# Patient Record
Sex: FEMALE | Race: WHITE | NOT HISPANIC OR LATINO | URBAN - METROPOLITAN AREA
[De-identification: names, ages, dates, MRNs, and addresses within clinical notes are randomized per-mention and may not be internally consistent; named-entity substitution may affect disease eponyms.]

---

## 2017-10-11 ENCOUNTER — EMERGENCY (EMERGENCY)
Facility: HOSPITAL | Age: 32
LOS: 1 days | Discharge: HOME | End: 2017-10-11
Attending: EMERGENCY MEDICINE | Admitting: EMERGENCY MEDICINE
Payer: MEDICAID

## 2017-10-11 VITALS
RESPIRATION RATE: 18 BRPM | TEMPERATURE: 98 F | HEART RATE: 64 BPM | OXYGEN SATURATION: 100 % | SYSTOLIC BLOOD PRESSURE: 125 MMHG | DIASTOLIC BLOOD PRESSURE: 68 MMHG

## 2017-10-11 DIAGNOSIS — T45.0X5A ADVERSE EFFECT OF ANTIALLERGIC AND ANTIEMETIC DRUGS, INITIAL ENCOUNTER: ICD-10-CM
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
## 2017-10-11 DIAGNOSIS — Z88.8 ALLERGY STATUS TO OTHER DRUGS, MEDICAMENTS AND BIOLOGICAL SUBSTANCES: ICD-10-CM

## 2017-10-11 DIAGNOSIS — S80.811A ABRASION, RIGHT LOWER LEG, INITIAL ENCOUNTER: ICD-10-CM

## 2017-10-11 DIAGNOSIS — M25.512 PAIN IN LEFT SHOULDER: ICD-10-CM

## 2017-10-11 DIAGNOSIS — S20.412A ABRASION OF LEFT BACK WALL OF THORAX, INITIAL ENCOUNTER: ICD-10-CM
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
## 2017-10-11 DIAGNOSIS — M25.511 PAIN IN RIGHT SHOULDER: ICD-10-CM

## 2017-10-11 DIAGNOSIS — Z88.0 ALLERGY STATUS TO PENICILLIN: ICD-10-CM

## 2017-10-11 DIAGNOSIS — S80.812A ABRASION, LEFT LOWER LEG, INITIAL ENCOUNTER: ICD-10-CM

## 2017-10-11 LAB
ALBUMIN SERPL ELPH-MCNC: 4.2 G/DL — SIGNIFICANT CHANGE UP (ref 3.4–5)
ALP SERPL-CCNC: 143 U/L — HIGH (ref 40–120)
ALT FLD-CCNC: 45 U/L — HIGH (ref 12–42)
ANION GAP SERPL CALC-SCNC: 11 MMOL/L — SIGNIFICANT CHANGE UP (ref 9–16)
AST SERPL-CCNC: 45 U/L — HIGH (ref 15–37)
BILIRUB SERPL-MCNC: 0.5 MG/DL — SIGNIFICANT CHANGE UP (ref 0.2–1.2)
BUN SERPL-MCNC: 15 MG/DL — SIGNIFICANT CHANGE UP (ref 7–23)
CALCIUM SERPL-MCNC: 9 MG/DL — SIGNIFICANT CHANGE UP (ref 8.5–10.5)
CHLORIDE SERPL-SCNC: 104 MMOL/L — SIGNIFICANT CHANGE UP (ref 96–108)
CO2 SERPL-SCNC: 24 MMOL/L — SIGNIFICANT CHANGE UP (ref 22–31)
CREAT SERPL-MCNC: 0.77 MG/DL — SIGNIFICANT CHANGE UP (ref 0.5–1.3)
ETHANOL SERPL-MCNC: < 3 MG/DL — SIGNIFICANT CHANGE UP
GLUCOSE SERPL-MCNC: 99 MG/DL — SIGNIFICANT CHANGE UP (ref 70–99)
HCT VFR BLD CALC: 41.6 % — SIGNIFICANT CHANGE UP (ref 34.5–45)
HGB BLD-MCNC: 14.2 G/DL — SIGNIFICANT CHANGE UP (ref 11.5–15.5)
LIDOCAIN IGE QN: 105 U/L — SIGNIFICANT CHANGE UP (ref 73–393)
MCHC RBC-ENTMCNC: 30 PG — SIGNIFICANT CHANGE UP (ref 27–34)
MCHC RBC-ENTMCNC: 34.1 G/DL — SIGNIFICANT CHANGE UP (ref 32–36)
MCV RBC AUTO: 87.9 FL — SIGNIFICANT CHANGE UP (ref 80–100)
PLATELET # BLD AUTO: 229 K/UL — SIGNIFICANT CHANGE UP (ref 150–400)
POTASSIUM SERPL-MCNC: 3.8 MMOL/L — SIGNIFICANT CHANGE UP (ref 3.5–5.3)
POTASSIUM SERPL-SCNC: 3.8 MMOL/L — SIGNIFICANT CHANGE UP (ref 3.5–5.3)
PROT SERPL-MCNC: 7.6 G/DL — SIGNIFICANT CHANGE UP (ref 6.4–8.2)
RBC # BLD: 4.73 M/UL — SIGNIFICANT CHANGE UP (ref 3.8–5.2)
RBC # FLD: 14.3 % — SIGNIFICANT CHANGE UP (ref 10.3–16.9)
SODIUM SERPL-SCNC: 139 MMOL/L — SIGNIFICANT CHANGE UP (ref 132–145)
WBC # BLD: 6.2 K/UL — SIGNIFICANT CHANGE UP (ref 3.8–10.5)
WBC # FLD AUTO: 6.2 K/UL — SIGNIFICANT CHANGE UP (ref 3.8–10.5)

## 2017-10-11 PROCEDURE — 71010: CPT | Mod: 26

## 2017-10-11 PROCEDURE — 70450 CT HEAD/BRAIN W/O DYE: CPT | Mod: 26

## 2017-10-11 PROCEDURE — 99220: CPT

## 2017-10-11 RX ORDER — ONDANSETRON 8 MG/1
4 TABLET, FILM COATED ORAL ONCE
Qty: 0 | Refills: 0 | Status: COMPLETED | OUTPATIENT
Start: 2017-10-11 | End: 2017-10-11

## 2017-10-11 RX ORDER — SODIUM CHLORIDE 9 MG/ML
1000 INJECTION INTRAMUSCULAR; INTRAVENOUS; SUBCUTANEOUS ONCE
Qty: 0 | Refills: 0 | Status: COMPLETED | OUTPATIENT
Start: 2017-10-11 | End: 2017-10-11

## 2017-10-11 RX ORDER — FAMOTIDINE 10 MG/ML
20 INJECTION INTRAVENOUS ONCE
Qty: 0 | Refills: 0 | Status: COMPLETED | OUTPATIENT
Start: 2017-10-11 | End: 2017-10-11

## 2017-10-11 RX ORDER — EPINEPHRINE 0.3 MG/.3ML
0.3 INJECTION INTRAMUSCULAR; SUBCUTANEOUS
Qty: 1 | Refills: 0 | OUTPATIENT
Start: 2017-10-11 | End: 2017-10-12

## 2017-10-11 RX ORDER — DIPHENHYDRAMINE HCL 50 MG
25 CAPSULE ORAL ONCE
Qty: 0 | Refills: 0 | Status: COMPLETED | OUTPATIENT
Start: 2017-10-11 | End: 2017-10-11

## 2017-10-11 RX ORDER — KETOROLAC TROMETHAMINE 30 MG/ML
30 SYRINGE (ML) INJECTION ONCE
Qty: 0 | Refills: 0 | Status: DISCONTINUED | OUTPATIENT
Start: 2017-10-11 | End: 2017-10-11

## 2017-10-11 RX ORDER — DIPHENHYDRAMINE HCL 50 MG
50 CAPSULE ORAL ONCE
Qty: 0 | Refills: 0 | Status: COMPLETED | OUTPATIENT
Start: 2017-10-11 | End: 2017-10-11

## 2017-10-11 RX ORDER — FAMOTIDINE 10 MG/ML
1 INJECTION INTRAVENOUS
Qty: 10 | Refills: 0 | OUTPATIENT
Start: 2017-10-11 | End: 2017-10-16

## 2017-10-11 RX ORDER — CETIRIZINE HYDROCHLORIDE 10 MG/1
1 TABLET ORAL
Qty: 5 | Refills: 0 | OUTPATIENT
Start: 2017-10-11 | End: 2017-10-16

## 2017-10-11 RX ADMIN — ONDANSETRON 4 MILLIGRAM(S): 8 TABLET, FILM COATED ORAL at 19:45

## 2017-10-11 RX ADMIN — Medication 50 MILLIGRAM(S): at 20:50

## 2017-10-11 RX ADMIN — SODIUM CHLORIDE 1000 MILLILITER(S): 9 INJECTION INTRAMUSCULAR; INTRAVENOUS; SUBCUTANEOUS at 19:45

## 2017-10-11 RX ADMIN — Medication 25 MILLIGRAM(S): at 20:50

## 2017-10-11 RX ADMIN — FAMOTIDINE 20 MILLIGRAM(S): 10 INJECTION INTRAVENOUS at 20:50

## 2017-10-11 RX ADMIN — Medication 125 MILLIGRAM(S): at 20:50
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
## 2017-10-11 NOTE — ED PROVIDER NOTE - MUSCULOSKELETAL, MLM
Spine appears normal, range of motion is not limited, b/l clavicle ttp, no deformity, no other pauline ttp.

## 2017-10-11 NOTE — ED ADULT NURSE REASSESSMENT NOTE - NS ED NURSE REASSESS COMMENT FT1
Pt had hives after getting benadryl. Medications were given as ordered. Pt had hives after getting benadryl. Medications were given as ordered. MD chavis.  Benadryl 50mg IV  solumedrol 125mg IV  pepcid 20mg IV    Pt states she feels better.
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
## 2017-10-11 NOTE — ED ADULT NURSE NOTE - CAS TRG GENERAL NORM CIRC DET
Strong peripheral pulses Shai Davis MD Well appearing. No distress. Happy and playful. Clear conj, PEERL, EOMI, davida-pharynx benign, supple neck, FROM, chest clear, RRR, Benign abd, Nonfocal neuro, diffuse fine papular rash concentrated on trunk but involving face and extremities.  Not palms or soles.

## 2017-10-11 NOTE — ED CDU PROVIDER NOTE - DETAILS
patient placed on obs of somnolence after fall, poss drug ingestion, and allergic reaction in ED to Zpfran that was Tx'd Benadryl 75,g, Pepcid and Solumedrol.
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
## 2017-10-11 NOTE — ED PROVIDER NOTE - PROGRESS NOTE DETAILS
CT head and CXR clar, labs wnl (mild elevation in LFTs). had allergic reaction to Zofran- dv'tr hives on arm and throat tightness. OP clear, lungs clear. Got Benadryl, Pepcid and Solumedrol. Declined Epi and was given second dose of Benadryl 255mg- appears that she takes up to 100mg at night usually. Will place on obs.

## 2017-10-12 VITALS
RESPIRATION RATE: 18 BRPM | OXYGEN SATURATION: 100 % | DIASTOLIC BLOOD PRESSURE: 89 MMHG | SYSTOLIC BLOOD PRESSURE: 128 MMHG | HEART RATE: 50 BPM | TEMPERATURE: 98 F

## 2017-10-12 PROCEDURE — 99217: CPT

## 2017-10-12 RX ORDER — DIPHENHYDRAMINE HCL 50 MG
25 CAPSULE ORAL ONCE
Qty: 0 | Refills: 0 | Status: COMPLETED | OUTPATIENT
Start: 2017-10-12 | End: 2017-10-12

## 2017-10-12 RX ORDER — KETOROLAC TROMETHAMINE 30 MG/ML
15 SYRINGE (ML) INJECTION ONCE
Qty: 0 | Refills: 0 | Status: DISCONTINUED | OUTPATIENT
Start: 2017-10-12 | End: 2017-10-12

## 2017-10-12 RX ORDER — METOCLOPRAMIDE HCL 10 MG
10 TABLET ORAL ONCE
Qty: 0 | Refills: 0 | Status: COMPLETED | OUTPATIENT
Start: 2017-10-12 | End: 2017-10-12

## 2017-10-12 RX ADMIN — Medication 10 MILLIGRAM(S): at 02:36

## 2017-10-12 RX ADMIN — Medication 25 MILLIGRAM(S): at 02:49

## 2017-10-12 RX ADMIN — Medication 15 MILLIGRAM(S): at 02:46

## 2017-10-12 NOTE — ED ADULT NURSE REASSESSMENT NOTE - NS ED NURSE REASSESS COMMENT FT1
given reglan as per md order for nausea; around 0240 patient complained of hives to right arm durign administration; iv medication stopped; MD Raymundo made aware and verbal order given to give benadryl 25mg iv; gave patient 25mg iv benadryl as per MD Raymundo order ; hives and redness receding around [id number]; patient stated less itching at this time

## 2018-03-26 ENCOUNTER — EMERGENCY (EMERGENCY)
Facility: HOSPITAL | Age: 33
LOS: 1 days | Discharge: HOME | End: 2018-03-26
Attending: EMERGENCY MEDICINE | Admitting: EMERGENCY MEDICINE
Payer: MEDICAID

## 2018-03-26 VITALS
TEMPERATURE: 98 F | RESPIRATION RATE: 16 BRPM | DIASTOLIC BLOOD PRESSURE: 69 MMHG | OXYGEN SATURATION: 99 % | HEART RATE: 85 BPM | SYSTOLIC BLOOD PRESSURE: 110 MMHG

## 2018-03-26 VITALS
HEART RATE: 51 BPM | TEMPERATURE: 98 F | DIASTOLIC BLOOD PRESSURE: 80 MMHG | OXYGEN SATURATION: 96 % | SYSTOLIC BLOOD PRESSURE: 115 MMHG | RESPIRATION RATE: 18 BRPM

## 2018-03-26 DIAGNOSIS — Z88.0 ALLERGY STATUS TO PENICILLIN: ICD-10-CM

## 2018-03-26 DIAGNOSIS — R21 RASH AND OTHER NONSPECIFIC SKIN ERUPTION: ICD-10-CM

## 2018-03-26 DIAGNOSIS — Z88.8 ALLERGY STATUS TO OTHER DRUGS, MEDICAMENTS AND BIOLOGICAL SUBSTANCES: ICD-10-CM

## 2018-03-26 DIAGNOSIS — K92.0 HEMATEMESIS: ICD-10-CM
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
## 2018-03-26 DIAGNOSIS — L73.9 FOLLICULAR DISORDER, UNSPECIFIED: ICD-10-CM

## 2018-03-26 LAB
AMPHET UR-MCNC: NEGATIVE — SIGNIFICANT CHANGE UP
APPEARANCE UR: CLEAR — SIGNIFICANT CHANGE UP
BACTERIA # UR AUTO: PRESENT /HPF
BARBITURATES UR SCN-MCNC: NEGATIVE — SIGNIFICANT CHANGE UP
BASOPHILS NFR BLD AUTO: 0.5 % — SIGNIFICANT CHANGE UP (ref 0–2)
BENZODIAZ UR-MCNC: POSITIVE
BILIRUB UR-MCNC: NEGATIVE — SIGNIFICANT CHANGE UP
COCAINE METAB.OTHER UR-MCNC: NEGATIVE — SIGNIFICANT CHANGE UP
COLOR SPEC: YELLOW — SIGNIFICANT CHANGE UP
DIFF PNL FLD: (no result)
EOSINOPHIL NFR BLD AUTO: 2.6 % — SIGNIFICANT CHANGE UP (ref 0–6)
EPI CELLS # UR: SIGNIFICANT CHANGE UP /HPF
ETHANOL SERPL-MCNC: <3 MG/DL — SIGNIFICANT CHANGE UP
GLUCOSE UR QL: NEGATIVE — SIGNIFICANT CHANGE UP
HCG UR QL: NEGATIVE — SIGNIFICANT CHANGE UP
HCT VFR BLD CALC: 38.9 % — SIGNIFICANT CHANGE UP (ref 34.5–45)
HGB BLD-MCNC: 13.6 G/DL — SIGNIFICANT CHANGE UP (ref 11.5–15.5)
IMM GRANULOCYTES NFR BLD AUTO: 0.3 % — SIGNIFICANT CHANGE UP (ref 0–1.5)
KETONES UR-MCNC: (no result) MG/DL
LEUKOCYTE ESTERASE UR-ACNC: (no result)
LYMPHOCYTES # BLD AUTO: 34.7 % — SIGNIFICANT CHANGE UP (ref 13–44)
MCHC RBC-ENTMCNC: 30.7 PG — SIGNIFICANT CHANGE UP (ref 27–34)
MCHC RBC-ENTMCNC: 35 G/DL — SIGNIFICANT CHANGE UP (ref 32–36)
MCV RBC AUTO: 87.8 FL — SIGNIFICANT CHANGE UP (ref 80–100)
METHADONE UR-MCNC: POSITIVE
MONOCYTES NFR BLD AUTO: 4.3 % — SIGNIFICANT CHANGE UP (ref 2–14)
NEUTROPHILS NFR BLD AUTO: 57.6 % — SIGNIFICANT CHANGE UP (ref 43–77)
NITRITE UR-MCNC: NEGATIVE — SIGNIFICANT CHANGE UP
OPIATES UR-MCNC: NEGATIVE — SIGNIFICANT CHANGE UP
PCP SPEC-MCNC: SIGNIFICANT CHANGE UP
PCP UR-MCNC: NEGATIVE — SIGNIFICANT CHANGE UP
PH UR: 6 — SIGNIFICANT CHANGE UP (ref 5–8)
PLATELET # BLD AUTO: 278 K/UL — SIGNIFICANT CHANGE UP (ref 150–400)
PROT UR-MCNC: (no result) MG/DL
RBC # BLD: 4.43 M/UL — SIGNIFICANT CHANGE UP (ref 3.8–5.2)
RBC # FLD: 13.2 % — SIGNIFICANT CHANGE UP (ref 10.3–16.9)
RBC CASTS # UR COMP ASSIST: (no result) /HPF
SP GR SPEC: 1.02 — SIGNIFICANT CHANGE UP (ref 1–1.03)
THC UR QL: POSITIVE
UROBILINOGEN FLD QL: 1 E.U./DL — SIGNIFICANT CHANGE UP
WBC # BLD: 10.1 K/UL — SIGNIFICANT CHANGE UP (ref 3.8–10.5)
WBC # FLD AUTO: 10.1 K/UL — SIGNIFICANT CHANGE UP (ref 3.8–10.5)
WBC UR QL: (no result) /HPF

## 2018-03-26 PROCEDURE — 99284 EMERGENCY DEPT VISIT MOD MDM: CPT

## 2018-03-26 RX ORDER — FAMOTIDINE 10 MG/ML
20 INJECTION INTRAVENOUS ONCE
Qty: 0 | Refills: 0 | Status: COMPLETED | OUTPATIENT
Start: 2018-03-26 | End: 2018-03-26

## 2018-03-26 RX ORDER — DIPHENHYDRAMINE HCL 50 MG
50 CAPSULE ORAL ONCE
Qty: 0 | Refills: 0 | Status: COMPLETED | OUTPATIENT
Start: 2018-03-26 | End: 2018-03-26

## 2018-03-26 RX ORDER — AZTREONAM 2 G
2 VIAL (EA) INJECTION
Qty: 40 | Refills: 0 | OUTPATIENT
Start: 2018-03-26 | End: 2018-04-04

## 2018-03-26 RX ORDER — METOCLOPRAMIDE HCL 10 MG
10 TABLET ORAL ONCE
Qty: 0 | Refills: 0 | Status: COMPLETED | OUTPATIENT
Start: 2018-03-26 | End: 2018-03-26

## 2018-03-26 RX ORDER — SODIUM CHLORIDE 9 MG/ML
1000 INJECTION INTRAMUSCULAR; INTRAVENOUS; SUBCUTANEOUS ONCE
Qty: 0 | Refills: 0 | Status: COMPLETED | OUTPATIENT
Start: 2018-03-26 | End: 2018-03-26

## 2018-03-26 RX ORDER — OMEPRAZOLE 10 MG/1
1 CAPSULE, DELAYED RELEASE ORAL
Qty: 14 | Refills: 0 | OUTPATIENT
Start: 2018-03-26 | End: 2018-04-08

## 2018-03-26 RX ORDER — ACETAMINOPHEN 500 MG
2 TABLET ORAL
Qty: 60 | Refills: 0 | OUTPATIENT
Start: 2018-03-26 | End: 2018-03-30

## 2018-03-26 RX ADMIN — Medication 100 MILLIGRAM(S): at 19:24

## 2018-03-26 RX ADMIN — SODIUM CHLORIDE 2000 MILLILITER(S): 9 INJECTION INTRAMUSCULAR; INTRAVENOUS; SUBCUTANEOUS at 19:24

## 2018-03-26 RX ADMIN — FAMOTIDINE 20 MILLIGRAM(S): 10 INJECTION INTRAVENOUS at 19:24

## 2018-03-26 RX ADMIN — Medication 10 MILLIGRAM(S): at 19:24

## 2018-03-26 RX ADMIN — Medication 50 MILLIGRAM(S): at 20:06

## 2018-03-26 NOTE — ED PROVIDER NOTE - CONSTITUTIONAL, MLM
normal... Well appearing, well nourished, awake, alert, oriented to person, place, time/situation and in no apparent distress. In distress due to pain. awake, alert, oriented to person, place, time/situation.

## 2018-03-26 NOTE — ED PROVIDER NOTE - OBJECTIVE STATEMENT
32y F with PMHx herniated disc, on methadone 130mg (3 years), has IUD allergy to penicillin, presents to ED for rash x 1.5 week. Pt went to PMD after she began experiencing painful "bumps" on her left axilla. She was prescribed Valtrex and bactrim, with no improvement of symptoms. Pt also states she has been vomiting with blood in emesis. Pt has had abscess in the past (left foot, s/p I&D). Lives in [place].

## 2018-03-26 NOTE — ED PROVIDER NOTE - MEDICAL DECISION MAKING DETAILS
Blood work, IV fluids, antibiotics, anti-emetics, and re-assess. Labs, fluids, IV dose clindamycin, and re-assess

## 2018-03-26 NOTE — ED ADULT TRIAGE NOTE - CHIEF COMPLAINT QUOTE
pt presents with rash to the left side of the body. states that she believes it is an infection or shingles. has been treated for both previously.

## 2018-03-26 NOTE — ED PROVIDER NOTE - SKIN, MLM
Left axilla: Left axilla: rash that is vesicular on an erythematous base with some areas of induration and tenderness, but no fluctuance noted on exam. Left axilla: rash has several areas of swollen follicles on erythematous base with some areas of induration and tenderness, but no fluctuance noted on exam.

## 2018-03-26 NOTE — ED PROVIDER NOTE - PROGRESS NOTE DETAILS
part of labs hemolyzed but pt refused redraw, US bedside shows no abscess collection. Will change abx to bactrim since there is skin infection (folliculitis) and UA looks infected as well. Instructed pt that if there is worsening symptoms to [facility] for re-evaluation. States she feels her PUD is exacerbated due to vomiting, so started on PPI and recommend GI F/U

## 2018-03-26 NOTE — ED ADULT NURSE REASSESSMENT NOTE - NS ED NURSE REASSESS COMMENT FT1
Patient's blood specimen hemolyzed - she is refusing blood work unless she receives something for pain/relaxation. Of note, patient received IV benadryl and her urine tox is positive for benzodiazepines, THC and methadone. Patient also holding onto a bag of her vomit, which she states has blood in it. Patient's emesis WNL & her H & H is stable. She reports nausea, but she was been visualized drinking from her [place] [employer]ced Tea. Pt moved to room for ultrasound of her right axilla. Safety and comfort maintained, will continue to monitor.

## 2018-08-29 ENCOUNTER — INPATIENT (INPATIENT)
Facility: HOSPITAL | Age: 33
LOS: 0 days | Discharge: HOME | End: 2018-08-30
Attending: INTERNAL MEDICINE | Admitting: INTERNAL MEDICINE
Payer: MEDICAID

## 2018-08-29 VITALS
DIASTOLIC BLOOD PRESSURE: 83 MMHG | HEIGHT: 67 IN | HEART RATE: 80 BPM | TEMPERATURE: 98 F | RESPIRATION RATE: 20 BRPM | SYSTOLIC BLOOD PRESSURE: 118 MMHG | WEIGHT: 139.99 LBS

## 2018-08-29 LAB
ALBUMIN SERPL ELPH-MCNC: 4.4 G/DL — SIGNIFICANT CHANGE UP (ref 3.3–5)
ALP SERPL-CCNC: 115 U/L — SIGNIFICANT CHANGE UP (ref 40–120)
ALT FLD-CCNC: 37 U/L — SIGNIFICANT CHANGE UP (ref 12–78)
ANION GAP SERPL CALC-SCNC: 6 MMOL/L — SIGNIFICANT CHANGE UP (ref 5–17)
APPEARANCE UR: CLEAR — SIGNIFICANT CHANGE UP
APTT BLD: 38.5 SEC — HIGH (ref 27.5–37.4)
AST SERPL-CCNC: 30 U/L — SIGNIFICANT CHANGE UP (ref 15–37)
BASOPHILS # BLD AUTO: 0.07 K/UL — SIGNIFICANT CHANGE UP (ref 0–0.2)
BASOPHILS NFR BLD AUTO: 0.6 % — SIGNIFICANT CHANGE UP (ref 0–2)
BILIRUB SERPL-MCNC: 0.3 MG/DL — SIGNIFICANT CHANGE UP (ref 0.2–1.2)
BILIRUB UR-MCNC: NEGATIVE — SIGNIFICANT CHANGE UP
BUN SERPL-MCNC: 13 MG/DL — SIGNIFICANT CHANGE UP (ref 7–23)
CALCIUM SERPL-MCNC: 9.7 MG/DL — SIGNIFICANT CHANGE UP (ref 8.5–10.1)
CHLORIDE SERPL-SCNC: 103 MMOL/L — SIGNIFICANT CHANGE UP (ref 96–108)
CO2 SERPL-SCNC: 29 MMOL/L — SIGNIFICANT CHANGE UP (ref 22–31)
COLOR SPEC: YELLOW — SIGNIFICANT CHANGE UP
CREAT SERPL-MCNC: 0.92 MG/DL — SIGNIFICANT CHANGE UP (ref 0.5–1.3)
DIFF PNL FLD: NEGATIVE — SIGNIFICANT CHANGE UP
EOSINOPHIL # BLD AUTO: 0.07 K/UL — SIGNIFICANT CHANGE UP (ref 0–0.5)
EOSINOPHIL NFR BLD AUTO: 0.6 % — SIGNIFICANT CHANGE UP (ref 0–6)
GLUCOSE SERPL-MCNC: 93 MG/DL — SIGNIFICANT CHANGE UP (ref 70–99)
GLUCOSE UR QL: NEGATIVE MG/DL — SIGNIFICANT CHANGE UP
HCG SERPL-ACNC: <1 MIU/ML — SIGNIFICANT CHANGE UP
HCT VFR BLD CALC: 39.3 % — SIGNIFICANT CHANGE UP (ref 34.5–45)
HGB BLD-MCNC: 13.7 G/DL — SIGNIFICANT CHANGE UP (ref 11.5–15.5)
IMM GRANULOCYTES NFR BLD AUTO: 0.3 % — SIGNIFICANT CHANGE UP (ref 0–1.5)
INR BLD: 1.04 RATIO — SIGNIFICANT CHANGE UP (ref 0.88–1.16)
KETONES UR-MCNC: NEGATIVE — SIGNIFICANT CHANGE UP
LEUKOCYTE ESTERASE UR-ACNC: NEGATIVE — SIGNIFICANT CHANGE UP
LIDOCAIN IGE QN: 85 U/L — SIGNIFICANT CHANGE UP (ref 73–393)
LYMPHOCYTES # BLD AUTO: 35.2 % — SIGNIFICANT CHANGE UP (ref 13–44)
LYMPHOCYTES # BLD AUTO: 4.07 K/UL — HIGH (ref 1–3.3)
MCHC RBC-ENTMCNC: 31.1 PG — SIGNIFICANT CHANGE UP (ref 27–34)
MCHC RBC-ENTMCNC: 34.9 GM/DL — SIGNIFICANT CHANGE UP (ref 32–36)
MCV RBC AUTO: 89.1 FL — SIGNIFICANT CHANGE UP (ref 80–100)
MONOCYTES # BLD AUTO: 0.52 K/UL — SIGNIFICANT CHANGE UP (ref 0–0.9)
MONOCYTES NFR BLD AUTO: 4.5 % — SIGNIFICANT CHANGE UP (ref 2–14)
NEUTROPHILS # BLD AUTO: 6.79 K/UL — SIGNIFICANT CHANGE UP (ref 1.8–7.4)
NEUTROPHILS NFR BLD AUTO: 58.8 % — SIGNIFICANT CHANGE UP (ref 43–77)
NITRITE UR-MCNC: NEGATIVE — SIGNIFICANT CHANGE UP
PCP SPEC-MCNC: SIGNIFICANT CHANGE UP
PH UR: 8 — SIGNIFICANT CHANGE UP (ref 5–8)
PLATELET # BLD AUTO: 285 K/UL — SIGNIFICANT CHANGE UP (ref 150–400)
POTASSIUM SERPL-MCNC: 4 MMOL/L — SIGNIFICANT CHANGE UP (ref 3.5–5.3)
POTASSIUM SERPL-SCNC: 4 MMOL/L — SIGNIFICANT CHANGE UP (ref 3.5–5.3)
PROT SERPL-MCNC: 8.1 GM/DL — SIGNIFICANT CHANGE UP (ref 6–8.3)
PROT UR-MCNC: NEGATIVE MG/DL — SIGNIFICANT CHANGE UP
PROTHROM AB SERPL-ACNC: 11.2 SEC — SIGNIFICANT CHANGE UP (ref 9.8–12.7)
RBC # BLD: 4.41 M/UL — SIGNIFICANT CHANGE UP (ref 3.8–5.2)
RBC # FLD: 13.2 % — SIGNIFICANT CHANGE UP (ref 10.3–14.5)
SODIUM SERPL-SCNC: 138 MMOL/L — SIGNIFICANT CHANGE UP (ref 135–145)
SP GR SPEC: 1.01 — SIGNIFICANT CHANGE UP (ref 1.01–1.02)
TYPE + AB SCN PNL BLD: SIGNIFICANT CHANGE UP
UROBILINOGEN FLD QL: 1 MG/DL
WBC # BLD: 11.56 K/UL — HIGH (ref 3.8–10.5)
WBC # FLD AUTO: 11.56 K/UL — HIGH (ref 3.8–10.5)

## 2018-08-29 PROCEDURE — 99285 EMERGENCY DEPT VISIT HI MDM: CPT

## 2018-08-29 PROCEDURE — 71045 X-RAY EXAM CHEST 1 VIEW: CPT | Mod: 26

## 2018-08-29 PROCEDURE — 93010 ELECTROCARDIOGRAM REPORT: CPT

## 2018-08-29 RX ORDER — DIPHENHYDRAMINE HCL 50 MG
25 CAPSULE ORAL EVERY 6 HOURS
Qty: 0 | Refills: 0 | Status: DISCONTINUED | OUTPATIENT
Start: 2018-08-29 | End: 2018-08-30

## 2018-08-29 RX ORDER — PANTOPRAZOLE SODIUM 20 MG/1
8 TABLET, DELAYED RELEASE ORAL
Qty: 80 | Refills: 0 | Status: DISCONTINUED | OUTPATIENT
Start: 2018-08-29 | End: 2018-08-30

## 2018-08-29 RX ORDER — ONDANSETRON 8 MG/1
4 TABLET, FILM COATED ORAL ONCE
Qty: 0 | Refills: 0 | Status: COMPLETED | OUTPATIENT
Start: 2018-08-29 | End: 2018-08-29

## 2018-08-29 RX ORDER — PANTOPRAZOLE SODIUM 20 MG/1
40 TABLET, DELAYED RELEASE ORAL
Qty: 0 | Refills: 0 | Status: DISCONTINUED | OUTPATIENT
Start: 2018-08-29 | End: 2018-08-30

## 2018-08-29 RX ORDER — DIPHENHYDRAMINE HCL 50 MG
50 CAPSULE ORAL ONCE
Qty: 0 | Refills: 0 | Status: COMPLETED | OUTPATIENT
Start: 2018-08-29 | End: 2018-08-29

## 2018-08-29 RX ORDER — DIPHENHYDRAMINE HCL 50 MG
25 CAPSULE ORAL ONCE
Qty: 0 | Refills: 0 | Status: COMPLETED | OUTPATIENT
Start: 2018-08-29 | End: 2018-08-29

## 2018-08-29 RX ORDER — PANTOPRAZOLE SODIUM 20 MG/1
40 TABLET, DELAYED RELEASE ORAL ONCE
Qty: 0 | Refills: 0 | Status: COMPLETED | OUTPATIENT
Start: 2018-08-29 | End: 2018-08-29

## 2018-08-29 RX ORDER — SOD SULF/SODIUM/NAHCO3/KCL/PEG
4000 SOLUTION, RECONSTITUTED, ORAL ORAL ONCE
Qty: 0 | Refills: 0 | Status: COMPLETED | OUTPATIENT
Start: 2018-08-29 | End: 2018-08-29

## 2018-08-29 RX ORDER — SODIUM CHLORIDE 9 MG/ML
1000 INJECTION, SOLUTION INTRAVENOUS
Qty: 0 | Refills: 0 | Status: DISCONTINUED | OUTPATIENT
Start: 2018-08-29 | End: 2018-08-30

## 2018-08-29 RX ORDER — DIPHENHYDRAMINE HCL 50 MG
25 CAPSULE ORAL EVERY 6 HOURS
Qty: 0 | Refills: 0 | Status: DISCONTINUED | OUTPATIENT
Start: 2018-08-29 | End: 2018-08-29

## 2018-08-29 RX ORDER — METHADONE HYDROCHLORIDE 40 MG/1
130 TABLET ORAL DAILY
Qty: 0 | Refills: 0 | Status: DISCONTINUED | OUTPATIENT
Start: 2018-08-29 | End: 2018-08-30

## 2018-08-29 RX ORDER — ONDANSETRON 8 MG/1
4 TABLET, FILM COATED ORAL EVERY 6 HOURS
Qty: 0 | Refills: 0 | Status: DISCONTINUED | OUTPATIENT
Start: 2018-08-29 | End: 2018-08-29

## 2018-08-29 RX ORDER — SODIUM CHLORIDE 9 MG/ML
1000 INJECTION INTRAMUSCULAR; INTRAVENOUS; SUBCUTANEOUS ONCE
Qty: 0 | Refills: 0 | Status: COMPLETED | OUTPATIENT
Start: 2018-08-29 | End: 2018-08-29

## 2018-08-29 RX ORDER — ACETAMINOPHEN 500 MG
650 TABLET ORAL EVERY 6 HOURS
Qty: 0 | Refills: 0 | Status: DISCONTINUED | OUTPATIENT
Start: 2018-08-29 | End: 2018-08-30

## 2018-08-29 RX ORDER — ACETAMINOPHEN 500 MG
1000 TABLET ORAL ONCE
Qty: 0 | Refills: 0 | Status: COMPLETED | OUTPATIENT
Start: 2018-08-29 | End: 2018-08-29

## 2018-08-29 RX ORDER — METOCLOPRAMIDE HCL 10 MG
5 TABLET ORAL EVERY 12 HOURS
Qty: 0 | Refills: 0 | Status: DISCONTINUED | OUTPATIENT
Start: 2018-08-29 | End: 2018-08-30

## 2018-08-29 RX ADMIN — Medication 1000 MILLIGRAM(S): at 19:35

## 2018-08-29 RX ADMIN — Medication 5 MILLIGRAM(S): at 13:55

## 2018-08-29 RX ADMIN — Medication 4000 MILLILITER(S): at 16:19

## 2018-08-29 RX ADMIN — Medication 25 MILLIGRAM(S): at 16:01

## 2018-08-29 RX ADMIN — PANTOPRAZOLE SODIUM 10 MG/HR: 20 TABLET, DELAYED RELEASE ORAL at 04:19

## 2018-08-29 RX ADMIN — METHADONE HYDROCHLORIDE 130 MILLIGRAM(S): 40 TABLET ORAL at 11:21

## 2018-08-29 RX ADMIN — Medication 25 MILLIGRAM(S): at 22:38

## 2018-08-29 RX ADMIN — ONDANSETRON 4 MILLIGRAM(S): 8 TABLET, FILM COATED ORAL at 03:10

## 2018-08-29 RX ADMIN — Medication 400 MILLIGRAM(S): at 19:02

## 2018-08-29 RX ADMIN — PANTOPRAZOLE SODIUM 10 MG/HR: 20 TABLET, DELAYED RELEASE ORAL at 19:33

## 2018-08-29 RX ADMIN — SODIUM CHLORIDE 100 MILLILITER(S): 9 INJECTION, SOLUTION INTRAVENOUS at 13:58

## 2018-08-29 RX ADMIN — Medication 1 MILLIGRAM(S): at 16:19

## 2018-08-29 RX ADMIN — Medication 25 MILLIGRAM(S): at 05:19

## 2018-08-29 RX ADMIN — Medication 50 MILLIGRAM(S): at 08:48

## 2018-08-29 RX ADMIN — Medication 125 MILLIGRAM(S): at 04:05

## 2018-08-29 RX ADMIN — PANTOPRAZOLE SODIUM 40 MILLIGRAM(S): 20 TABLET, DELAYED RELEASE ORAL at 03:10

## 2018-08-29 RX ADMIN — SODIUM CHLORIDE 1000 MILLILITER(S): 9 INJECTION INTRAMUSCULAR; INTRAVENOUS; SUBCUTANEOUS at 03:10

## 2018-08-29 RX ADMIN — Medication 25 MILLIGRAM(S): at 03:24

## 2018-08-29 RX ADMIN — Medication 1 MILLIGRAM(S): at 13:12

## 2018-08-29 NOTE — ED ADULT NURSE NOTE - OBJECTIVE STATEMENT
Patient BIBA complaining of blood in emesis and stool. Patient states she became nauseous and began vomiting earlier this afternoon, 4 episodes of vomiting today. Patient states there has been blood in stool and emesis, denies diarrhea. Patient states she had a chills and fever of 103, took Tylenol at home 2 hours ago, patient currently afebrile. Patient complaining of dizziness, decreased eating and drinking, abdominal pain, bloating, and distention. PMHx of ulcers, HTN. Patient denies SOB, CP. Boyfriend at bedside. VS stable

## 2018-08-29 NOTE — ED PROVIDER NOTE - OBJECTIVE STATEMENT
32 yo f with hx of substance abuse on methadone, xanax, clonidine and PUD presents stating she vomited and had a bloody bowel movement today while watching her daughter.  no precipitating or exacerbating factors.  per patient she does not drink alcohol either, no drug use.  no vomiting prior to vomiting blood, no dysuria or vaginal discharge.  c/o abdominal pain.  does not have a gastroenterologist

## 2018-08-29 NOTE — ED ADULT TRIAGE NOTE - CHIEF COMPLAINT QUOTE
pt BIBEMS c/o abd pain and distention starting earlier today. pt had 3 episodes of BRB in stool and emesis.

## 2018-08-29 NOTE — ED ADULT NURSE REASSESSMENT NOTE - NS ED NURSE REASSESS COMMENT FT1
Benadryl administered for severe itching per patient with reported relief. No signs of rash or erythema present. MD Ram aware. Will continue to monitor.

## 2018-08-29 NOTE — ED PROVIDER NOTE - NS HIV RISK FACTOR YES
[facility] PROGRESS NOTE  Pulmonary, Critical Care, and Sleep Medicine    Name: Cony Rapp MRN: [id number]   : 1949 Hospital: [facility]   Date: 2017  Admission Date: 2017     Chart and notes reviewed. Data reviewed. I review the patient's current medications in the medical record at each encounter.  I have evaluated and examined the patient. .     Overnight events reviewed:  No overnight events  Afebrile  BP stable  99% RA    ROS: Feels much better. Able to talk without coughing as much. Feels her sputum is clearing, not much color to sputum this morning. Denies cp/n/v. Reports significant improvement in breathing after cardioversion when she came in. Vital Signs:  Visit Vitals    /74 (BP 1 Location: Right arm, BP Patient Position: Sitting)    Pulse 98    Temp 97.7 °F (36.5 °C)    Resp 18    Ht 5' 6\" (1.676 m)    Wt 75 kg (165 lb 5.5 oz)    SpO2 99%    Breastfeeding No    BMI 26.69 kg/m2       O2 Device: Room air   O2 Flow Rate (L/min): 1 l/min   Temp (24hrs), Av.9 °F (36.6 °C), Min:97.2 °F (36.2 °C), Max:98.5 °F (36.9 °C)       Intake/Output:   Last shift:         Last 3 shifts:  1901 -  0700  In: 1120 [P.O.:1120]  Out: 2625 [Urine:2625]    Intake/Output Summary (Last 24 hours) at 17 0841  Last data filed at 17 5027   Gross per 24 hour   Intake             1120 ml   Output             2625 ml   Net            -1505 ml       Physical Exam:   General:  Alert, cooperative, no distress, appears stated age. Head:  Normocephalic, without obvious abnormality, atraumatic. Eyes:  Conjunctivae/corneas clear. PERRL, EOMs intact. Nose: Nares normal. Septum midline. Mucosa normal. No drainage or sinus tenderness. Throat: Lips, mucosa, and tongue normal. Teeth and gums normal.   Neck: Supple, symmetrical, trachea midline, no adenopathy, thyroid: no enlargment/tenderness/nodules, no carotid bruit and no JVD. Lungs:   Diminished bilaterally. Chest wall:  No tenderness or deformity. Heart:  Regular rate and rhythm, S1, S2 normal, no murmur, click, rub or gallop. Abdomen:   Soft, non-tender. Bowel sounds normal. No masses,  No organomegaly. Extremities: Extremities normal, atraumatic, no cyanosis or edema. Pulses: 2+ and symmetric all extremities. Skin: Skin color, texture, turgor normal. No rashes or lesions   Lymph nodes: Cervical, supraclavicular, and axillary nodes normal.   Neurologic: Grossly nonfocal     DATA:  MAR reviewed and pertinent medications noted or modified as needed    Labs:  Recent Labs      05/18/17   0443   WBC  6.7   HGB  11.7   HCT  36.2   PLT  275     Recent Labs      05/19/17   0428  05/18/17   0443  05/17/17   0330   NA  134*  139  138   K  4.1  3.9  3.5   CL  99  104  102   CO2  23  25  27   GLU  288*  131*  153*   BUN  15  11  13   CREA  1.08*  0.99  0.89   CA  8.9  8.8  8.6   MG  2.1  2.1  2.2   INR  1.3*  1.1  1.0       Imaging:  I have personally reviewed the patients radiographs and reports. No new imaging this morning. IMPRESSION:   · Hemoptysis has resolved, denies seeing any recently.   · COPD/Emphysema  · Acute systolic Heart Failure  · A Fib  · C Diff   · DM  · Prior tobacco use      PLAN:   · Cont Diuresis  · She is back on Plavix and Coumadin  · Cont Breo and Incruse  · Repeat CT in 8-12 for resolution         Donne Severe, DEEPAK Declined

## 2018-08-29 NOTE — PATIENT PROFILE ADULT. - COMFORT LEVEL, ACCEPTABLE
Nephrology ([facility]) Progress Note      Patient:  Wild Bravo  YOB: 1932  Date of Service: 9/11/2017  MRN: [id number]   Acct: [id number]   Primary Care Physician: Eliel Ames MD  Advance Directive: Full Code  Admit Date: 9/8/2017       Hospital Day: 1  Referring Provider: No ref. provider found      Patient personally seen and examined.  Complete chart including Consults, Notes, Operative Reports, Labs, Cardiology, and Radiology studies reviewed as able.        Subjective:  Wild Bravo is a 85 y.o. male  whom we were consulted for chronic kidney disease management.  Follows with Dr Crawford; creatinine had recently been 2.6 mg; improved to 1.8 mg after decreasing diuretic dose.  Admitted after a fall at home.  Only complaint has been of mild dyspnea.  Family has been very concerned about pt's inability to care for self at home.  No complaint today, anxious to go home soon.  No new overnight issues.    Allergies:  Keflex [cephalexin]    Home Meds:  Prescriptions Prior to Admission   Medication Sig Dispense Refill Last Dose   • amiodarone (PACERONE) 200 MG tablet Take 1 tablet by mouth Daily. 30 tablet 1 Past Week at Unknown time   • carvedilol (COREG) 3.125 MG tablet Take 3.125 mg by mouth 2 (Two) Times a Day With Meals.   Past Week at Unknown time   • furosemide (LASIX) 40 MG tablet Take 40 mg by mouth Daily As Needed (edema).   Past Month at Unknown time   • gabapentin (NEURONTIN) 300 MG capsule Take 300 mg by mouth 2 (Two) Times a Day.   Past Week at Unknown time   • HYDROcodone-acetaminophen (NORCO) 7.5-325 MG per tablet Take 1 tablet by mouth Every 8 (Eight) Hours As Needed for Moderate Pain .   Past Week at Unknown time   • LORazepam (ATIVAN) 2 MG tablet Take 2 mg by mouth Every 8 (Eight) Hours As Needed for Anxiety.   Past Week at Unknown time   • aspirin 81 MG EC tablet Take 81 mg by mouth Daily.   Unknown at Unknown time   • atorvastatin (LIPITOR) 20  MG tablet Take 20 mg by mouth Daily.   Unknown at Unknown time   • dutasteride (AVODART) 0.5 MG capsule Take 0.5 mg by mouth Daily.   Unknown at Unknown time   • lisinopril (PRINIVIL,ZESTRIL) 5 MG tablet Take 2.5 mg by mouth Daily.   Unknown at Unknown time   • nitroglycerin (NITROSTAT) 0.4 MG SL tablet Place 0.4 mg under the tongue Every 5 (Five) Minutes As Needed for Chest Pain. Take no more than 3 doses in 15 minutes.   Unknown at Unknown time   • rOPINIRole (REQUIP) 2 MG tablet Take 2 mg by mouth 2 (Two) Times a Day.   Unknown at Unknown time   • tamsulosin (FLOMAX) 0.4 MG capsule 24 hr capsule Take 1 capsule by mouth Every Night.   Unknown at Unknown time       Medicines:  Current Facility-Administered Medications   Medication Dose Route Frequency Provider Last Rate Last Dose   • amiodarone (PACERONE) tablet 200 mg  200 mg Oral Q24H Karthik Macdonald MD   200 mg at 09/11/17 0916   • atorvastatin (LIPITOR) tablet 10 mg  10 mg Oral Nightly Karthik Macdonald MD   10 mg at 09/10/17 2049   • bumetanide (BUMEX) injection 0.5 mg  0.5 mg Intravenous BID Karthik Macdonald MD   0.5 mg at 09/11/17 0916   • calcitriol (ROCALTROL) capsule 0.25 mcg  0.25 mcg Oral Daily Daniele Sharpe MD   0.25 mcg at 09/11/17 0916   • carvedilol (COREG) tablet 3.125 mg  3.125 mg Oral BID With Meals Karthik Macdonald MD   3.125 mg at 09/11/17 1119   • docusate sodium (COLACE) capsule 100 mg  100 mg Oral BID Lyric Gracia PA-C   100 mg at 09/11/17 0916   • enoxaparin (LOVENOX) syringe 30 mg  30 mg Subcutaneous Daily Karthik Macdonald MD   30 mg at 09/10/17 1642   • finasteride (PROSCAR) tablet 5 mg  5 mg Oral Daily Karthik Macdonald MD   5 mg at 09/11/17 0916   • gabapentin (NEURONTIN) capsule 200 mg  200 mg Oral Daily Jessica Rivas MD       • gabapentin (NEURONTIN) capsule 400 mg  400 mg Oral Daily Daniele Sharpe MD   400 mg at 09/10/17 1959   • HYDROcodone-acetaminophen (NORCO) 7.5-325 MG per tablet 1 tablet  1 tablet Oral Q6H PRN  Karthik Macdonald MD   1 tablet at 09/11/17 0601   • iron sucrose (VENOFER) 200 mg in sodium chloride 0.9 % 100 mL IVPB  200 mg Intravenous Q24H Jessica Rivas MD   Stopped at 09/10/17 1415   • lisinopril (PRINIVIL,ZESTRIL) tablet 2.5 mg  2.5 mg Oral Q24H Karthik Macdonald MD   2.5 mg at 09/11/17 0916   • LORazepam (ATIVAN) tablet 1 mg  1 mg Oral 4x Daily Jessica Rivas MD       • nitroglycerin (NITROSTAT) SL tablet 0.4 mg  0.4 mg Sublingual Q5 Min PRN Karthik Macdonald MD       • rOPINIRole (REQUIP) tablet 1 mg  1 mg Oral Nightly Jessica Rivas MD   1 mg at 09/10/17 2319   • sennosides-docusate sodium (SENOKOT-S) 8.6-50 MG tablet 2 tablet  2 tablet Oral BID PRN Lyric Garcia PA-C       • sodium chloride 0.9 % flush 1-10 mL  1-10 mL Intravenous PRN Karthik Macdonald MD   10 mL at 09/10/17 2049   • tamsulosin (FLOMAX) 24 hr capsule 0.4 mg  0.4 mg Oral Nightly Karthik Macdonald MD           Past Medical History:  Past Medical History:   Diagnosis Date   • Abdominal aortic aneurysm    • Anxiety    • Atrial fibrillation    • Biventricular ICD (implantable cardioverter-defibrillator) in place    • BPH (benign prostatic hypertrophy)    • Carotid artery stenosis    • CHF (congestive heart failure)    • Chronic kidney disease     Chronic kidney disease, stage 4 (severe)   • Chronic systolic (congestive) heart failure     limited echo 7/2016 EF was 40-45%. Patient has BiV AICD   • Coronary arteriosclerosis     MI x2   • Hearing loss    • Iron deficiency anemia    • Ischemic cardiomyopathy    • Mixed hyperlipidemia    • Myocardial infarction    • Stroke        Past Surgical History:  Past Surgical History:   Procedure Laterality Date   • CARDIAC ABLATION     • CARDIAC CATHETERIZATION     • CARDIAC PACEMAKER PLACEMENT     • CARDIOVERSION     • CAROTID ENDARTERECTOMY     • CORONARY ARTERY BYPASS GRAFT     • MITRAL VALVE REPLACEMENT     • TOTAL KNEE ARTHROPLASTY         Family History  Family History   Problem Relation  "Age of Onset   • Stroke Mother    • Heart disease Mother    • Diabetes Father        Social History  Social History     Social History   • Marital status:      Spouse name: N/A   • Number of children: N/A   • Years of education: N/A     Occupational History   • Not on file.     Social History Main Topics   • Smoking status: Former Smoker   • Smokeless tobacco: Never Used   • Alcohol use No   • Drug use: No   • Sexual activity: Defer     Other Topics Concern   • Not on file     Social History Narrative         Review of Systems:  History obtained from chart review and the patient  General ROS: No fever or chills  Respiratory ROS: No cough, shortness of breath, wheezing  Cardiovascular ROS: positive for - dyspnea on exertion  negative for - chest pain  Gastrointestinal ROS: No abdominal pain or melena  Genito-Urinary ROS: No dysuria or hematuria  14 point ROS reviewed with the patient and negative except as noted above and in the HPI unless unable to obtain.    Objective:  /58 (BP Location: Left arm, Patient Position: Lying)  Pulse 72  Temp 97.9 °F (36.6 °C) (Temporal Artery )   Resp 18  Ht 73.5\" (186.7 cm)  Wt 153 lb 6.4 oz (69.6 kg)  SpO2 96%  BMI 19.96 kg/m2    Intake/Output Summary (Last 24 hours) at 09/11/17 1126  Last data filed at 09/11/17 0935   Gross per 24 hour   Intake             1200 ml   Output             2000 ml   Net             -800 ml     General: awake/alert   Chest:  clear to auscultation bilaterally without respiratory distress  CVS: regular rate and rhythm  Abdominal: soft, nontender, normal bowel sounds  Extremities: no cyanosis or edema  Skin: warm and dry without rash      Labs:    Results from last 7 days  Lab Units 09/08/17  1300   WBC 10*3/mm3 5.60   HEMOGLOBIN g/dL 12.9*   HEMATOCRIT % 41.3   PLATELETS 10*3/mm3 177           Results from last 7 days  Lab Units 09/11/17  0555 09/10/17  0523 09/09/17  0318  09/08/17  1300   SODIUM mmol/L 138 137 138  < > 139   POTASSIUM " mmol/L 4.0 3.9 3.9  < > 4.8   CHLORIDE mmol/L 99 99 98  < > 97*   CO2 mmol/L 30.0 30.0 33.0*  < > 31.0   BUN mg/dL 47* 48* 48*  < > 52*   CREATININE mg/dL 1.80* 1.91* 1.71*  < > 1.94*   CALCIUM mg/dL 8.9 8.6 9.0  < > 9.8   BILIRUBIN mg/dL  --   --   --   --  1.1*   ALK PHOS U/L  --   --   --   --  83   ALT (SGPT) U/L  --   --   --   --  47   AST (SGOT) U/L  --   --   --   --  40   GLUCOSE mg/dL 95 93 97  < > 98   < > = values in this interval not displayed.    Radiology:   Imaging Results (last 72 hours)     Procedure Component Value Units Date/Time    XR Chest PA & Lateral [[id number]] Collected:  09/08/17 1622     Updated:  09/08/17 1627    Narrative:       EXAMINATION:   XR CHEST PA AND LATERAL-  9/8/2017 3:22 PM CST     HISTORY: Short of breath     PA and lateral views the chest obtained. Hyperinflation increase in AP  diameter chest is noted consistent with COPD.     Cardiac silhouette is mildly enlarged.     Prosthetic cardiac valve is present in the mitral position.     Pacing device is present projecting over the soft tissues of the left  chest. Wires are present median sternotomy. Vascular calcifications  present in the aortic arch.     Benign calcifications in the milagros are present bilaterally.       Impression:       Mild cardiomegaly no evidence of pulmonary vascular  congestion.  2. COPD  This report was finalized on 09/08/2017 16:24 by Dr. Yg Bowden MD.    CT Head Without Contrast [[id number]] Collected:  09/08/17 1633     Updated:  09/08/17 1639    Narrative:       CT HEAD WO CONTRAST- 9/8/2017 5:21 PM EDT     HISTORY: falls, alt mental status       Technique:   Axial CT of the brain without IV contrast. Sagittal and coronal  reformations are also provided for review. Soft tissue and bone kernels  are available for interpretation.     Comparison: None.     Findings:      There is no evidence of acute large vascular distribution infarct, mass  lesion or hemorrhage.  The ventricles, cortical sulci and  basal cisterns  are symmetric and age appropriate. Mild generalized symmetric volume  loss is identified. There is atheromatous calcification in the  intracranial vertebral arteries as well as the bilateral paraclinoid  ICAs.  Normal brainstem and posterior fossa.  The scalp and calvarium  are unremarkable.        Impression:       Impression:    1. No acute intracranial process  This report was finalized on 09/08/2017 16:36 by Dr Jem Carreno, .    US Abdomen Complete [[id number]] Collected:  09/09/17 0942     Updated:  09/09/17 0949    Narrative:       ULTRASOUND ABDOMEN COMPLETE 9/9/2017 8:20 AM CDT     REASON FOR EXAM: abdominal pain, hx aaa       COMPARISON: None      TECHNIQUE: Multiple longitudinal and transverse real-time sonographic  images of the abdomen are obtained.      FINDINGS:       LIVER: Normal in size, smooth in contour, and homogeneous in  echogenicity. No focal lesion is seen.     BILIARY: Echoes are noted in the gallbladder with distal shadowing  consistent with cholelithiasis.. The common bile duct measures 0.7 cm in  diameter. There is no evidence of intrahepatic ductal dilatation.       KIDNEYS: The right and left kidneys are normal in size, shape,  orientation, and position measuring 5.8 x 7.3 x 14.9 cm and 5.2 x 6.2 x  12.2 cm, respectively. The corticomedullary differentiation is  maintained. There is no evidence of nephrolithiasis, hydronephrosis or  perinephric fluid collection. Cysts are present associated right kidney  the largest is 5.7 cm. The largest left renal cyst is at the upper pole  and is 6.4 cm No hydroureter is seen.     PANCREAS: No focal lesion or abnormality.      SPLEEN: Normal in size and appearance.      OTHER: No ascites is present. The aorta is aneurysmal and measures 39 mm  in maximal diameter. Inferior vena cava is visualized.     The prostate is enlarged. The prostate is 7.2 x 9 cm.        Impression:       1. Cholelithiasis.        2. Enlarged prostate Yuliana  graph  3. Renal cyst     This report was finalized on 09/09/2017 09:46 by Dr. Yg Bowden MD.    FL Esophagram Complete [[id number]] Collected:  09/10/17 1050     Updated:  09/10/17 1055    Narrative:       EXAMINATION:   FL ESOPHAGRAM COMPLETE-  9/10/2017 10:50 AM CDT     HISTORY: Epigastric pain     1.2 minutes of fluoroscopic time was used during this exam. 13 images  were obtained.     Barium was swallowed without difficulty. The esophagus initiates normal  peristalsis. Mild spasms present in the proximal third of the esophagus.  There was some stasis of barium in the proximal third of the esophagus  but eventually swallowing caused the area of stasis to peristalse. There  was no reflux.     IMPRESSION mild spasm in the mid esophagus  This report was finalized on 09/10/2017 10:52 by Dr. Yg Bowden MD.    US Carotid Bilateral [[id number]] Collected:  09/11/17 0739     Updated:  09/11/17 0742    Narrative:       History: Carotid occlusive disease       Impression:       Impression:  1. There is less than 50% stenosis of the right internal carotid artery.  2. There is 50-69% stenosis of the left internal carotid artery.  3. Antegrade flow is demonstrated in bilateral vertebral arteries.     Comments: Bilateral carotid vertebral arterial duplex scan was  performed.     Grayscale imaging shows intimal thickening and calcified elements at the  carotid bifurcation. The right internal carotid artery peak systolic  velocity is 48.3 cm/sec. The end-diastolic velocity is 19.3 cm/sec. The  right ICA/CCA ratio is approximately 0.83 . These findings correlate  with less than 50% stenosis of the right internal carotid artery.     Grayscale imaging shows intimal thickening and calcified elements at the  carotid bifurcation. The left internal carotid artery peak systolic  velocity is 148 cm/sec. The end-diastolic velocity is 60.9 cm/sec. The  left ICA/CCA ratio is approximately 4.3 . These findings correlate with  50-69%  stenosis of the left internal carotid artery.     Antegrade flow is demonstrated in bilateral vertebral arteries.       This report was finalized on 09/11/2017 07:39 by Dr. Epi Rogers MD.          Culture:         Assessment   1.  Chronic kidney disease stage 3 due to hypertensive nephrosclerosis  2.  Recent acute kidney injury from diuretic use  3.  Benign hypertension  4.  Status post fall at home  5.  Bilateral renal cysts  6.  Cognitive impairment  7.  Secondary hyperparathyroidism    Plan:  1.  Renal function stable  2.  Agree with increase of Neurontin dose; would use caution with doses above 700 mg/daily given current renal function.      Helio Ariza, APRN  9/11/2017  11:26 AM     0

## 2018-08-29 NOTE — ED PROVIDER NOTE - MEDICAL DECISION MAKING DETAILS
34 yo f with c/lo abdominal pain and vomiting and having a bloody bowel movement. rectal exam, labs, imaging, ppi, reassess

## 2018-08-29 NOTE — ED ADULT NURSE REASSESSMENT NOTE - NS ED NURSE REASSESS COMMENT FT1
Patient care received from RN Kaylene TORRES VSS, pt resting in bed. Protonix gtt infusing as prescribed. No overt s/s of bleeding present. Wait time explained, hospitalist consult pending. Safety & comfort measures in place, will continue to monitor.

## 2018-08-29 NOTE — H&P ADULT - NSHPLABSRESULTS_GEN_ALL_CORE
Urinalysis Basic - ( 29 Aug 2018 07:33 )    Color: Yellow / Appearance: Clear / S.010 / pH: x  Gluc: x / Ketone: Negative  / Bili: Negative / Urobili: 1 mg/dL   Blood: x / Protein: Negative mg/dL / Nitrite: Negative   Leuk Esterase: Negative / RBC: x / WBC x   Sq Epi: x / Non Sq Epi: x / Bacteria: x    29 Aug 2018 03:01    138    |  103    |  13     ----------------------------<  93     4.0     |  29     |  0.92     Ca    9.7        29 Aug 2018 03:01    TPro  8.1    /  Alb  4.4    /  TBili  0.3    /  DBili  x      /  AST  30     /  ALT  37     /  AlkPhos  115    29 Aug 2018 03:01  LIVER FUNCTIONS - ( 29 Aug 2018 03:01 )  Alb: 4.4 g/dL / Pro: 8.1 gm/dL / ALK PHOS: 115 U/L / ALT: 37 U/L / AST: 30 U/L / GGT: x         PT/INR - ( 29 Aug 2018 03:01 )   PT: 11.2 sec;   INR: 1.04 ratio         PTT - ( 29 Aug 2018 03:01 )  PTT:38.5 secCBC Full  -  ( 29 Aug 2018 03:01 )  WBC Count : 11.56 K/uL  Hemoglobin : 13.7 g/dL  Hematocrit : 39.3 %  Platelet Count - Automated : 285 K/uL  Mean Cell Volume : 89.1 fl  Mean Cell Hemoglobin : 31.1 pg  Mean Cell Hemoglobin Concentration : 34.9 gm/dL  Auto Neutrophil # : 6.79 K/uL  Auto Lymphocyte # : 4.07 K/uL  Auto Monocyte # : 0.52 K/uL  Auto Eosinophil # : 0.07 K/uL  Auto Basophil # : 0.07 K/uL  Auto Neutrophil % : 58.8 %  Auto Lymphocyte % : 35.2 %  Auto Monocyte % : 4.5 %  Auto Eosinophil % : 0.6 %  Auto Basophil % : 0.6 %

## 2018-08-29 NOTE — ED ADULT NURSE REASSESSMENT NOTE - NS ED NURSE REASSESS COMMENT FT1
Patient anxious complaining of of itching in R arm, patient was given benadryl and solumedrol. MD made aware. Family at bedside. Will continue to monitor

## 2018-08-29 NOTE — ED ADULT NURSE NOTE - NSIMPLEMENTINTERV_GEN_ALL_ED
Implemented All Universal Safety Interventions:  [place] to call system. Call bell, personal items and telephone within reach. Instruct patient to call for assistance. Room bathroom lighting operational. Non-slip footwear when patient is off stretcher. Physically safe environment: no spills, clutter or unnecessary equipment. Stretcher in lowest position, wheels locked, appropriate side rails in place.

## 2018-08-29 NOTE — H&P ADULT - ASSESSMENT
32 yo f with hx of substance abuse on methadone, xanax, clonidine and PUD presents  with bright red blood per rectum and bright red blood vomitting. Patient states she has a history of PUD. she noticed some abdominal pairn, generaized . Assicaited with sever enausea , vommitting  denies any fevers, chills, diahrea..  In Er patient had bright red blood around her rectum, but refused digital exam.       admit for possible gi bleed  Golytely toniht  EGD/ colo aaliyah valle  -- Dr. Miller  dvt proph- scds sec bleeding

## 2018-08-29 NOTE — H&P ADULT - HISTORY OF PRESENT ILLNESS
34 yo f with hx of substance abuse on methadone, xanax, clonidine and PUD presents stating she vomited and had a bloody bowel movement today while watching her daughter.  no precipitating or exacerbating factors.  per patient she does not drink alcohol either, no drug use.  no vomiting prior to vomiting blood, no dysuria or vaginal discharge.  c/o abdominal pain.  does not have a gastroenterologist 32 yo f with hx of substance abuse on methadone, xanax, clonidine and PUD presents  with bright red blood per rectum and bright red blood vomitting. Patient states she has a history of PUD. she noticed some abdominal pairn, generaized . Assicaited with sever enausea , vommitting  denies any fevers, chills, diahrea..  In Er patient had bright red blood around her rectum, but refused digital exam.

## 2018-08-29 NOTE — ED ADULT NURSE REASSESSMENT NOTE - NS ED NURSE REASSESS COMMENT FT1
Patient resting in bed, safety & comfort measures in place. No s/s of distress present. NPO, protonix gtt infusing as prescribed. GI consult pending. Plan of care discussed. Hand-off report to RN Karma, awaiting transport to . Hourly rounding on my time.

## 2018-08-30 ENCOUNTER — TRANSCRIPTION ENCOUNTER (OUTPATIENT)
Age: 33
End: 2018-08-30

## 2018-08-30 VITALS
HEART RATE: 68 BPM | OXYGEN SATURATION: 100 % | TEMPERATURE: 99 F | RESPIRATION RATE: 16 BRPM | SYSTOLIC BLOOD PRESSURE: 134 MMHG | DIASTOLIC BLOOD PRESSURE: 81 MMHG

## 2018-08-30 LAB
ANION GAP SERPL CALC-SCNC: 10 MMOL/L — SIGNIFICANT CHANGE UP (ref 5–17)
BASOPHILS # BLD AUTO: 0.05 K/UL — SIGNIFICANT CHANGE UP (ref 0–0.2)
BASOPHILS NFR BLD AUTO: 0.4 % — SIGNIFICANT CHANGE UP (ref 0–2)
BUN SERPL-MCNC: 9 MG/DL — SIGNIFICANT CHANGE UP (ref 7–23)
CALCIUM SERPL-MCNC: 8.4 MG/DL — LOW (ref 8.5–10.1)
CHLORIDE SERPL-SCNC: 107 MMOL/L — SIGNIFICANT CHANGE UP (ref 96–108)
CO2 SERPL-SCNC: 25 MMOL/L — SIGNIFICANT CHANGE UP (ref 22–31)
CREAT SERPL-MCNC: 0.72 MG/DL — SIGNIFICANT CHANGE UP (ref 0.5–1.3)
EOSINOPHIL # BLD AUTO: 0.05 K/UL — SIGNIFICANT CHANGE UP (ref 0–0.5)
EOSINOPHIL NFR BLD AUTO: 0.4 % — SIGNIFICANT CHANGE UP (ref 0–6)
GLUCOSE SERPL-MCNC: 94 MG/DL — SIGNIFICANT CHANGE UP (ref 70–99)
HCT VFR BLD CALC: 38.1 % — SIGNIFICANT CHANGE UP (ref 34.5–45)
HGB BLD-MCNC: 13.2 G/DL — SIGNIFICANT CHANGE UP (ref 11.5–15.5)
IMM GRANULOCYTES NFR BLD AUTO: 0.3 % — SIGNIFICANT CHANGE UP (ref 0–1.5)
LYMPHOCYTES # BLD AUTO: 47.3 % — HIGH (ref 13–44)
LYMPHOCYTES # BLD AUTO: 5.98 K/UL — HIGH (ref 1–3.3)
MCHC RBC-ENTMCNC: 30.8 PG — SIGNIFICANT CHANGE UP (ref 27–34)
MCHC RBC-ENTMCNC: 34.6 GM/DL — SIGNIFICANT CHANGE UP (ref 32–36)
MCV RBC AUTO: 89 FL — SIGNIFICANT CHANGE UP (ref 80–100)
MONOCYTES # BLD AUTO: 0.54 K/UL — SIGNIFICANT CHANGE UP (ref 0–0.9)
MONOCYTES NFR BLD AUTO: 4.3 % — SIGNIFICANT CHANGE UP (ref 2–14)
NEUTROPHILS # BLD AUTO: 5.99 K/UL — SIGNIFICANT CHANGE UP (ref 1.8–7.4)
NEUTROPHILS NFR BLD AUTO: 47.3 % — SIGNIFICANT CHANGE UP (ref 43–77)
NRBC # BLD: 0 /100 WBCS — SIGNIFICANT CHANGE UP (ref 0–0)
PLATELET # BLD AUTO: 278 K/UL — SIGNIFICANT CHANGE UP (ref 150–400)
POTASSIUM SERPL-MCNC: 3.8 MMOL/L — SIGNIFICANT CHANGE UP (ref 3.5–5.3)
POTASSIUM SERPL-SCNC: 3.8 MMOL/L — SIGNIFICANT CHANGE UP (ref 3.5–5.3)
RBC # BLD: 4.28 M/UL — SIGNIFICANT CHANGE UP (ref 3.8–5.2)
RBC # FLD: 13.2 % — SIGNIFICANT CHANGE UP (ref 10.3–14.5)
SODIUM SERPL-SCNC: 142 MMOL/L — SIGNIFICANT CHANGE UP (ref 135–145)
WBC # BLD: 12.65 K/UL — HIGH (ref 3.8–10.5)
WBC # FLD AUTO: 12.65 K/UL — HIGH (ref 3.8–10.5)

## 2018-08-30 PROCEDURE — 95819 EEG AWAKE AND ASLEEP: CPT | Mod: 26

## 2018-08-30 RX ORDER — METHADONE HYDROCHLORIDE 40 MG/1
13 TABLET ORAL
Qty: 0 | Refills: 0 | COMMUNITY
Start: 2018-08-30

## 2018-08-30 RX ORDER — ACETAMINOPHEN 500 MG
1000 TABLET ORAL ONCE
Qty: 0 | Refills: 0 | Status: COMPLETED | OUTPATIENT
Start: 2018-08-30 | End: 2018-08-30

## 2018-08-30 RX ORDER — DIPHENHYDRAMINE HCL 50 MG
25 CAPSULE ORAL ONCE
Qty: 0 | Refills: 0 | Status: COMPLETED | OUTPATIENT
Start: 2018-08-30 | End: 2018-08-30

## 2018-08-30 RX ORDER — DIPHENHYDRAMINE HCL 50 MG
50 CAPSULE ORAL ONCE
Qty: 0 | Refills: 0 | Status: DISCONTINUED | OUTPATIENT
Start: 2018-08-30 | End: 2018-08-30

## 2018-08-30 RX ORDER — DIPHENHYDRAMINE HCL 50 MG
50 CAPSULE ORAL ONCE
Qty: 0 | Refills: 0 | Status: COMPLETED | OUTPATIENT
Start: 2018-08-30 | End: 2018-08-30

## 2018-08-30 RX ORDER — PANTOPRAZOLE SODIUM 20 MG/1
1 TABLET, DELAYED RELEASE ORAL
Qty: 30 | Refills: 0 | OUTPATIENT
Start: 2018-08-30 | End: 2018-09-28

## 2018-08-30 RX ADMIN — Medication 25 MILLIGRAM(S): at 02:55

## 2018-08-30 RX ADMIN — Medication 25 MILLIGRAM(S): at 09:17

## 2018-08-30 RX ADMIN — SODIUM CHLORIDE 100 MILLILITER(S): 9 INJECTION, SOLUTION INTRAVENOUS at 05:18

## 2018-08-30 RX ADMIN — Medication 400 MILLIGRAM(S): at 06:28

## 2018-08-30 RX ADMIN — Medication 1 MILLIGRAM(S): at 11:07

## 2018-08-30 RX ADMIN — Medication 25 MILLIGRAM(S): at 13:06

## 2018-08-30 RX ADMIN — PANTOPRAZOLE SODIUM 10 MG/HR: 20 TABLET, DELAYED RELEASE ORAL at 05:44

## 2018-08-30 RX ADMIN — METHADONE HYDROCHLORIDE 130 MILLIGRAM(S): 40 TABLET ORAL at 11:18

## 2018-08-30 NOTE — CONSULT NOTE ADULT - SUBJECTIVE AND OBJECTIVE BOX
Patient is a 33y old  Female who presents with a chief complaint of     HPI:  32 yo f with hx of substance abuse on methadone, xanax, clonidine and PUD presents  with bright red blood per rectum and bright red blood vomitting. Patient states she has a history of PUD. she noticed some abdominal pairn, generaized . Assicaited with sever enausea , vommitting  denies any fevers, chills, diahrea..  In Er patient had bright red blood around her rectum, but refused digital exam.     Patient was given a colonoscopy prep last night and had a hard time with drinking it. NG tube was offered and she refused this. She states that she drank the whole preparation and did not have a single bowel movement however, nursing was concern the patient may have not drank the entire preparation and may have Some in the Bathroom.  She Was Brought Test Is to Have an Endoscopy Performed Dizzy Episode of Nausea Vomiting and May Have Had a Seizure Prior to Endoscopy and Therefore Procedure Was Canceled.  She Denies Any Chest Pain or Shortness of Breath.  She Has Multiple Complaints of and Her Review of System Was Reviewed by Me.  She Denies Use of Any Anti-Inflammatories.  She Is Very Concerned for Methadone Gets Delayed.  She Also States That She Often Takes Benzodiazepines.  Denies Any Family History of Colon Cancer Stomach Cancer or Any Cancers.  She Refused a Rectal Exam with Me.        PAST MEDICAL & SURGICAL HISTORY:  No pertinent past medical history  No significant past surgical history      MEDICATIONS  (STANDING):  dextrose 5% + sodium chloride 0.9%. 1000 milliLiter(s) (100 mL/Hr) IV Continuous <Continuous>  diphenhydrAMINE   Capsule 50 milliGRAM(s) Oral once  diphenhydrAMINE   Capsule 50 milliGRAM(s) Oral once  methadone    Tablet 130 milliGRAM(s) Oral daily  pantoprazole    Tablet 40 milliGRAM(s) Oral before breakfast  pantoprazole Infusion 8 mG/Hr (10 mL/Hr) IV Continuous <Continuous>    MEDICATIONS  (PRN):  acetaminophen   Tablet 650 milliGRAM(s) Oral every 6 hours PRN For Temp greater than 38 C (100.4 F) or mild pain  diphenhydrAMINE   Injectable 25 milliGRAM(s) IV Push every 6 hours PRN Rash and/or Itching  LORazepam   Injectable 1 milliGRAM(s) IV Push every 3 hours PRN Anxiety  metoclopramide Injectable 5 milliGRAM(s) IV Push every 12 hours PRN nausea      Allergies    penicillin (Anaphylaxis; Rash; Short breath)  penicillins (Unknown)  Zofran (Hives; Pruritus)  Zofran (Rash; Short breath; Hives)    Intolerances            FAMILY HISTORY:  No pertinent family history in first degree relatives          Vital Signs Last 24 Hrs  T(C): 37.2 (30 Aug 2018 11:28), Max: 37.2 (30 Aug 2018 11:28)  T(F): 99 (30 Aug 2018 11:28), Max: 99 (30 Aug 2018 11:28)  HR: 68 (30 Aug 2018 11:28) (61 - 74)  BP: 134/81 (30 Aug 2018 11:28) (118/93 - 134/81)  BP(mean): --  RR: 16 (30 Aug 2018 11:28) (16 - 18)  SpO2: 100% (30 Aug 2018 11:28) (98% - 100%)    PHYSICAL EXAM:    Constitutional: NAD, well-developed  HEENT: EOMI, throat clear  Neck: No LAD, supple  Respiratory: CTA and P  Cardiovascular: S1 and S2, RRR, no M  Gastrointestinal: BS+, soft, epig tender/ND, neg HSM,  Extremities: No peripheral edema, neg clubing, cyanosis  Vascular: 2+ peripheral pulses  Neurological: A/O x 3, no focal deficits  Psychiatric: Normal mood, normal affect  Skin: No rashes    LABS:  CBC Full  -  ( 30 Aug 2018 06:04 )  WBC Count : 12.65 K/uL  Hemoglobin : 13.2 g/dL  Hematocrit : 38.1 %  Platelet Count - Automated : 278 K/uL  Mean Cell Volume : 89.0 fl  Mean Cell Hemoglobin : 30.8 pg  Mean Cell Hemoglobin Concentration : 34.6 gm/dL  Auto Neutrophil # : 5.99 K/uL  Auto Lymphocyte # : 5.98 K/uL  Auto Monocyte # : 0.54 K/uL  Auto Eosinophil # : 0.05 K/uL  Auto Basophil # : 0.05 K/uL  Auto Neutrophil % : 47.3 %  Auto Lymphocyte % : 47.3 %  Auto Monocyte % : 4.3 %  Auto Eosinophil % : 0.4 %  Auto Basophil % : 0.4 %    08-30    142  |  107  |  9   ----------------------------<  94  3.8   |  25  |  0.72    Ca    8.4<L>      30 Aug 2018 06:04    TPro  8.1  /  Alb  4.4  /  TBili  0.3  /  DBili  x   /  AST  30  /  ALT  37  /  AlkPhos  115  08-29    PT/INR - ( 29 Aug 2018 03:01 )   PT: 11.2 sec;   INR: 1.04 ratio         PTT - ( 29 Aug 2018 03:01 )  PTT:38.5 sec        RADIOLOGY & ADDITIONAL STUDIES:  < from: Xray Chest 1 View AP/PA. (08.29.18 @ 04:30) >  EXAM:  XR CHEST AP OR PA 1V                            PROCEDURE DATE:  08/29/2018          INTERPRETATION:  Clinical information: Shortness of breath. Evaluate for   free air under the diaphragm.    AP view of the chest from 0417 hours:     COMPARISON:  October 11, 2017    The cardiac, hilar and mediastinal contours are unremarkable. The lungs   are clear, with note taken of the extreme apices being excluded. The   osseous structures demonstrate no acute abnormality.  No free air seen   under the diaphragm.    IMPRESSION:    No pneumoperitoneum.        < end of copied text >

## 2018-08-30 NOTE — PROVIDER CONTACT NOTE (MEDICATION) - ASSESSMENT
She then looked at me and asked if I saw that.  Pt. takes methadone, when the transporter arrived at her room she asked if she could have her methadone.  Dr. Orlando Anesthesiologist stated since it is 13 pills and a full cup of water she could not take it since she would be receiving anesthesia within a half hour.

## 2018-08-30 NOTE — CONSULT NOTE ADULT - ASSESSMENT
Nausea vomiting, possible small amount of blood with it although patient is not clear.  Endoscopy not performed secondary to seizures.  It is not clear that patient indeed had a seizure or not she was talking throughout the episode in asking nurses the endoscopy room to watch her as she was having seizures.  Would continue PPI and also would maintain patient on anti-emetics.  Differential of bleeding reviewed with patient.  Hematocrit appears to be stable.    Possible seizures and evaluation by primary care physician, discussed with primary care.    Bright red blood per rectum, possibly hemorrhoidal versus other etiology. Differential including that of cancer reviewed with patient.  She does not want have a rectal exam or evaluation.    If hematocrit remains stable, she can follow up as an outpatient for endoscopy and colonoscopy.  Drug-seeking behavior should be considered.  Risk of missed diagnoses without follow-up including that of cancers was reviewed with patient earlier this morning prior to bring her to the endoscopy unit and again I called patient around 11:30 and discussed with her as well prior to writing this note.

## 2018-08-30 NOTE — PROVIDER CONTACT NOTE (MEDICATION) - SITUATION
pt received to Endoscopy holding area pre procedure stating she had a seizure.  When I addressed her she asked if I saw "it".

## 2018-08-30 NOTE — PROVIDER CONTACT NOTE (MEDICATION) - BACKGROUND
Almost immediately after, her arms and body started twitching and her head jerked back and her eyes rolled back and lashes were fluttering approximately 3 seconds.

## 2018-08-30 NOTE — DISCHARGE NOTE ADULT - PATIENT PORTAL LINK FT
You can access the [facility] Patient Portal, offered by [facility], by registering with the following website: [email or web address]

## 2018-08-30 NOTE — DISCHARGE NOTE ADULT - HOSPITAL COURSE
Vital Signs Last 24 Hrs  T(C): 37.2 (30 Aug 2018 11:28), Max: 37.2 (30 Aug 2018 11:28)  T(F): 99 (30 Aug 2018 11:), Max: 99 (30 Aug 2018 11:)  HR: 68 (30 Aug 2018 11:) (61 - 74)  BP: 134/81 (30 Aug 2018 11:) (118/93 - 134/81)  BP(mean): --  RR: 16 (30 Aug 2018 11:) (16 - 18)  SpO2: 100% (30 Aug 2018 11:) (98% - 100%)    HEENT:   pupils equal and reactive, EOMI, no oropharyngeal lesions, erythema, exudates, oral thrush    NECK:   supple, no carotid bruits, no palpable lymph nodes, no thyromegaly    CV:  +S1, +S2, regular, no murmurs or rubs    RESP:   lungs clear to auscultation bilaterally, no wheezing, rales, rhonchi, good air entry bilaterally    BREAST:  not examined    GI:  abdomen soft, non-tender, non-distended, normal BS, no bruits, no abdominal masses, no palpable masses    RECTAL:  not examined    :  not examined    MSK:   normal muscle tone, no atrophy, no rigidity, no contractions    EXT:   no clubbing, no cyanosis, no edema, no calf pain, swelling or erythema    VASCULAR:  pulses equal and symmetric in the upper and lower extremities    NEURO:  AAOX3, no focal neurological deficits, follows all commands, able to move extremities spontaneously    SKIN:  no ulcers, lesions or rashes    Urinalysis Basic - ( 29 Aug 2018 07:33 )    Color: Yellow / Appearance: Clear / S.010 / pH: x  Gluc: x / Ketone: Negative  / Bili: Negative / Urobili: 1 mg/dL   Blood: x / Protein: Negative mg/dL / Nitrite: Negative   Leuk Esterase: Negative / RBC: x / WBC x   Sq Epi: x / Non Sq Epi: x / Bacteria: x    30 Aug 2018 06:04    142    |  107    |  9      ----------------------------<  94     3.8     |  25     |  0.72     Ca    8.4        30 Aug 2018 06:04    TPro  8.1    /  Alb  4.4    /  TBili  0.3    /  DBili  x      /  AST  30     /  ALT  37     /  AlkPhos  115    29 Aug 2018 03:01  LIVER FUNCTIONS - ( 29 Aug 2018 03:01 )  Alb: 4.4 g/dL / Pro: 8.1 gm/dL / ALK PHOS: 115 U/L / ALT: 37 U/L / AST: 30 U/L / GGT: x         PT/INR - ( 29 Aug 2018 03:01 )   PT: 11.2 sec;   INR: 1.04 ratio         PTT - ( 29 Aug 2018 03:01 )  PTT:38.5 secCBC Full  -  ( 30 Aug 2018 06:04 )  WBC Count : 12.65 K/uL  Hemoglobin : 13.2 g/dL  Hematocrit : 38.1 %  Platelet Count - Automated : 278 K/uL  Mean Cell Volume : 89.0 fl  Mean Cell Hemoglobin : 30.8 pg  Mean Cell Hemoglobin Concentration : 34.6 gm/dL    Hospital course:   34 yo f with hx of substance abuse on methadone, xanax, clonidine and PUD presents  with bright red blood per rectum and bright red blood vomitting. Patient states she has a history of PUD. she noticed some abdominal pairn, generaized . Assicaited with sever enausea , vommitting  denies any fevers, chills, diahrea..  In Er patient had bright red blood around her rectum, but refused digital exam.  admit for possible gi bleed. Hemaglobin remained stable. She was taken to EGD / colo room but it was not performed as patient said she felt like she was having seizures. She was witness by several OR nruses, and there was very low clinical  suspicion  for seizure. Nontheless, EEG was ordered and she was evaluated by neurology. Patient had negative EEG.  Her hb remains stable and she had resolution of her symptoms. There has been no gi bleeding noticed while inpatient. She is  medically cleared for discharge. Advised to f/u outptient with her methadone clinic, Dr. acuna and her psychiatrist.

## 2018-08-30 NOTE — DISCHARGE NOTE ADULT - MEDICATION SUMMARY - MEDICATIONS TO STOP TAKING
I will STOP taking the medications listed below when I get home from the hospital:    Bactrim  mg-160 mg oral tablet  -- 2 tab(s) by mouth 2 times a day  -- Avoid prolonged or excessive exposure to direct and/or artificial sunlight while taking this medication.  Finish all this medication unless otherwise directed by prescriber.  Medication should be taken with plenty of water.

## 2018-08-30 NOTE — PROVIDER CONTACT NOTE (MEDICATION) - ACTION/TREATMENT ORDERED:
Dr Taylor and Dr Orlando made aware Dr Orlando immediately to bedside. Pts Vss no additional seizures. Dr Taylor to pts bedside cancelled procedure until stable

## 2018-08-30 NOTE — PROVIDER CONTACT NOTE (MEDICATION) - RECOMMENDATIONS
VS immediately following seizure were 150/93 hr 66 R 22 pulse ox 100% room air.  Pt c/o itching.  Arms and chest were red.  Pt states she needs benedryl.  RN on floor aware of itching prior it event.

## 2018-08-30 NOTE — DISCHARGE NOTE ADULT - CARE PROVIDER_API CALL
Trung Taylor), Gastroenterology  [address] [place] [address]  Phone: [phone]  Fax: [phone]

## 2018-08-30 NOTE — DISCHARGE NOTE ADULT - MEDICATION SUMMARY - MEDICATIONS TO TAKE
I will START or STAY ON the medications listed below when I get home from the hospital:    acetaminophen 325 mg oral tablet  -- 2 tab(s) by mouth every 4 hours   -- This product contains acetaminophen.  Do not use  with any other product containing acetaminophen to prevent possible liver damage.    -- Indication: For pain    methadone 10 mg oral tablet  -- 13 tab(s) by mouth once a day  -- Indication: For chronic med    Protonix 40 mg oral delayed release tablet  -- 1 tab(s) by mouth once a day   -- It is very important that you take or use this exactly as directed.  Do not skip doses or discontinue unless directed by your doctor.  Obtain medical advice before taking any non-prescription drugs as some may affect the action of this medication.  Swallow whole.  Do not crush.    -- Indication: For GI bleed

## 2018-08-30 NOTE — DISCHARGE NOTE ADULT - CARE PLAN
Principal Discharge DX:	Gastrointestinal hemorrhage associated with anorectal source  Goal:	possibly from hemmoroids. f/u outpatient with  w/ in 2 weeks.  Assessment and plan of treatment:	c/w oral protonix 40mg daily

## 2018-09-04 DIAGNOSIS — R56.9 UNSPECIFIED CONVULSIONS: ICD-10-CM

## 2018-09-04 DIAGNOSIS — R11.10 VOMITING, UNSPECIFIED: ICD-10-CM

## 2018-09-04 DIAGNOSIS — K64.9 UNSPECIFIED HEMORRHOIDS: ICD-10-CM

## 2018-09-04 DIAGNOSIS — K27.9 PEPTIC ULCER, SITE UNSPECIFIED, UNSPECIFIED AS ACUTE OR CHRONIC, WITHOUT HEMORRHAGE OR PERFORATION: ICD-10-CM

## 2018-09-04 DIAGNOSIS — Z80.0 FAMILY HISTORY OF MALIGNANT NEOPLASM OF DIGESTIVE ORGANS: ICD-10-CM

## 2018-09-04 DIAGNOSIS — F11.99 OPIOID USE, UNSPECIFIED WITH UNSPECIFIED OPIOID-INDUCED DISORDER: ICD-10-CM

## 2018-09-04 DIAGNOSIS — Z76.5 MALINGERER [CONSCIOUS SIMULATION]: ICD-10-CM

## 2019-02-01 ENCOUNTER — EMERGENCY (EMERGENCY)
Facility: HOSPITAL | Age: 34
LOS: 1 days | Discharge: HOME | End: 2019-02-01
Attending: EMERGENCY MEDICINE | Admitting: EMERGENCY MEDICINE
Payer: MEDICAID

## 2019-02-01 VITALS
RESPIRATION RATE: 16 BRPM | SYSTOLIC BLOOD PRESSURE: 109 MMHG | HEART RATE: 91 BPM | DIASTOLIC BLOOD PRESSURE: 69 MMHG | TEMPERATURE: 100 F | OXYGEN SATURATION: 96 %

## 2019-02-01 DIAGNOSIS — Z88.0 ALLERGY STATUS TO PENICILLIN: ICD-10-CM

## 2019-02-01 DIAGNOSIS — Z79.899 OTHER LONG TERM (CURRENT) DRUG THERAPY: ICD-10-CM

## 2019-02-01 DIAGNOSIS — J06.9 ACUTE UPPER RESPIRATORY INFECTION, UNSPECIFIED: ICD-10-CM

## 2019-02-01 DIAGNOSIS — Z79.891 LONG TERM (CURRENT) USE OF OPIATE ANALGESIC: ICD-10-CM

## 2019-02-01 DIAGNOSIS — Z79.1 LONG TERM (CURRENT) USE OF NON-STEROIDAL ANTI-INFLAMMATORIES (NSAID): ICD-10-CM

## 2019-02-01 DIAGNOSIS — Z88.8 ALLERGY STATUS TO OTHER DRUGS, MEDICAMENTS AND BIOLOGICAL SUBSTANCES: ICD-10-CM

## 2019-02-01 DIAGNOSIS — R50.9 FEVER, UNSPECIFIED: ICD-10-CM

## 2019-02-01 DIAGNOSIS — F11.10 OPIOID ABUSE, UNCOMPLICATED: ICD-10-CM

## 2019-02-01 LAB
ALBUMIN SERPL ELPH-MCNC: 3.6 G/DL — SIGNIFICANT CHANGE UP (ref 3.4–5)
ALP SERPL-CCNC: 123 U/L — HIGH (ref 40–120)
ALT FLD-CCNC: 28 U/L — SIGNIFICANT CHANGE UP (ref 12–42)
ANION GAP SERPL CALC-SCNC: 11 MMOL/L — SIGNIFICANT CHANGE UP (ref 9–16)
APPEARANCE UR: CLEAR — SIGNIFICANT CHANGE UP
AST SERPL-CCNC: 31 U/L — SIGNIFICANT CHANGE UP (ref 15–37)
BILIRUB SERPL-MCNC: 0.4 MG/DL — SIGNIFICANT CHANGE UP (ref 0.2–1.2)
BILIRUB UR-MCNC: ABNORMAL
BUN SERPL-MCNC: 9 MG/DL — SIGNIFICANT CHANGE UP (ref 7–23)
CALCIUM SERPL-MCNC: 8.9 MG/DL — SIGNIFICANT CHANGE UP (ref 8.5–10.5)
CHLORIDE SERPL-SCNC: 103 MMOL/L — SIGNIFICANT CHANGE UP (ref 96–108)
CK SERPL-CCNC: 95 U/L — SIGNIFICANT CHANGE UP (ref 26–192)
CO2 SERPL-SCNC: 22 MMOL/L — SIGNIFICANT CHANGE UP (ref 22–31)
COLOR SPEC: YELLOW — SIGNIFICANT CHANGE UP
CREAT SERPL-MCNC: 0.57 MG/DL — SIGNIFICANT CHANGE UP (ref 0.5–1.3)
DIFF PNL FLD: NEGATIVE — SIGNIFICANT CHANGE UP
ETHANOL SERPL-MCNC: <3 MG/DL — SIGNIFICANT CHANGE UP
GLUCOSE SERPL-MCNC: 85 MG/DL — SIGNIFICANT CHANGE UP (ref 70–99)
GLUCOSE UR QL: NEGATIVE — SIGNIFICANT CHANGE UP
HCG UR QL: NEGATIVE — SIGNIFICANT CHANGE UP
HCT VFR BLD CALC: 36.8 % — SIGNIFICANT CHANGE UP (ref 34.5–45)
HGB BLD-MCNC: 12.6 G/DL — SIGNIFICANT CHANGE UP (ref 11.5–15.5)
KETONES UR-MCNC: NEGATIVE — SIGNIFICANT CHANGE UP
LACTATE SERPL-SCNC: 0.4 MMOL/L — SIGNIFICANT CHANGE UP (ref 0.4–2)
LEUKOCYTE ESTERASE UR-ACNC: NEGATIVE — SIGNIFICANT CHANGE UP
MCHC RBC-ENTMCNC: 30.9 PG — SIGNIFICANT CHANGE UP (ref 27–34)
MCHC RBC-ENTMCNC: 34.2 G/DL — SIGNIFICANT CHANGE UP (ref 32–36)
MCV RBC AUTO: 90.2 FL — SIGNIFICANT CHANGE UP (ref 80–100)
NITRITE UR-MCNC: NEGATIVE — SIGNIFICANT CHANGE UP
NT-PROBNP SERPL-SCNC: 29 PG/ML — SIGNIFICANT CHANGE UP
PCP SPEC-MCNC: SIGNIFICANT CHANGE UP
PH UR: 6 — SIGNIFICANT CHANGE UP (ref 5–8)
PLATELET # BLD AUTO: 253 K/UL — SIGNIFICANT CHANGE UP (ref 150–400)
POTASSIUM SERPL-MCNC: 4.2 MMOL/L — SIGNIFICANT CHANGE UP (ref 3.5–5.3)
POTASSIUM SERPL-SCNC: 4.2 MMOL/L — SIGNIFICANT CHANGE UP (ref 3.5–5.3)
PROT SERPL-MCNC: 7.6 G/DL — SIGNIFICANT CHANGE UP (ref 6.4–8.2)
PROT UR-MCNC: NEGATIVE MG/DL — SIGNIFICANT CHANGE UP
RBC # BLD: 4.08 M/UL — SIGNIFICANT CHANGE UP (ref 3.8–5.2)
RBC # FLD: 12.6 % — SIGNIFICANT CHANGE UP (ref 10.3–14.5)
SODIUM SERPL-SCNC: 136 MMOL/L — SIGNIFICANT CHANGE UP (ref 132–145)
SP GR SPEC: 1.02 — SIGNIFICANT CHANGE UP (ref 1–1.03)
TROPONIN I SERPL-MCNC: <0.017 NG/ML — LOW (ref 0.02–0.06)
UROBILINOGEN FLD QL: 0.2 E.U./DL — SIGNIFICANT CHANGE UP
WBC # BLD: 8.6 K/UL — SIGNIFICANT CHANGE UP (ref 3.8–10.5)
WBC # FLD AUTO: 8.6 K/UL — SIGNIFICANT CHANGE UP (ref 3.8–10.5)

## 2019-02-01 PROCEDURE — 71045 X-RAY EXAM CHEST 1 VIEW: CPT | Mod: 26

## 2019-02-01 PROCEDURE — 93010 ELECTROCARDIOGRAM REPORT: CPT

## 2019-02-01 PROCEDURE — 99218: CPT | Mod: 25

## 2019-02-01 RX ORDER — AZITHROMYCIN 500 MG/1
500 TABLET, FILM COATED ORAL ONCE
Qty: 0 | Refills: 0 | Status: COMPLETED | OUTPATIENT
Start: 2019-02-01 | End: 2019-02-01

## 2019-02-01 RX ORDER — METOCLOPRAMIDE HCL 10 MG
10 TABLET ORAL ONCE
Qty: 0 | Refills: 0 | Status: COMPLETED | OUTPATIENT
Start: 2019-02-01 | End: 2019-02-01

## 2019-02-01 RX ORDER — AZITHROMYCIN 500 MG/1
1 TABLET, FILM COATED ORAL
Qty: 4 | Refills: 0 | OUTPATIENT
Start: 2019-02-01 | End: 2019-02-04

## 2019-02-01 RX ADMIN — AZITHROMYCIN 500 MILLIGRAM(S): 500 TABLET, FILM COATED ORAL at 18:07

## 2019-02-01 RX ADMIN — Medication 10 MILLIGRAM(S): at 18:07

## 2019-02-01 NOTE — ED CDU PROVIDER INITIAL DAY NOTE - PROGRESS NOTE DETAILS
Accept sign out from Dr. land.  Pt on observation for possible PNA vs pulmonary congestion 2/2 heroin abuse.  Also needs to metabolize opiates 2/2 recent use.  On Azithromycin.  Will f/u cardiac labs and will perform serial exams to ensure pt is metabolizing appropriately.

## 2019-02-01 NOTE — ED PROVIDER NOTE - CPE EDP NEURO NORM
----- Message from Colton Miner MD sent at 12/21/2018  1:11 PM CST -----  TSH normal.  Continue present dose of levothyroxine   normal...

## 2019-02-01 NOTE — ED PROVIDER NOTE - CONSTITUTIONAL, MLM
normal... Oriented to person, place, time/situation. Patient is sleeping but easily aroused and conversant.

## 2019-02-01 NOTE — ED CDU PROVIDER INITIAL DAY NOTE - OBJECTIVE STATEMENT
34 y/o female with PMHx of heroin abuse (currently in methadone maintenance program), kidney stones, and PUD presents to ED c/o fever. Patient brought in by EMS from her [facility] rehab program (reports that she took Methadone today but denies any heroin use). However, she reports productive cough, fever, nausea, and congestion, and was sent here for further evaluation to be cleared. Denies CP, SOB, vomiting, and abd pain. Patient is slightly sleepy in the ED has temperature of 100.3F. She is on Xanax, Gabapentin, and Seroquel. Denies any SI or HI.

## 2019-02-01 NOTE — ED PROVIDER NOTE - DIAGNOSTIC INTERPRETATION
Chest x-ray interpreted by ER Physician Dr. Raymundo  Findings: heart size normal, no infiltrates, lungs fully expanded, soft tissues appear normal.

## 2019-02-01 NOTE — ED CDU PROVIDER INITIAL DAY NOTE - MEDICAL DECISION MAKING DETAILS
34 y/o female with PMHx of heroin abuse (currently in methadone maintenance program), kidney stones, and PUD presents to ED c/o fever. Patient brought in by EMS from her [facility] rehab program (reports that she took Methadone today but denies any heroin use). However, she reports productive cough, fever, nausea, and congestion, and was sent here for further evaluation to be cleared. Denies CP, SOB, vomiting, and abd pain. Patient is slightly sleepy in the ED has temperature of 100.3F. She is on Xanax, Gabapentin, and Seroquel. Denies any SI or HI.  Will place pt in CDU and administer azithromycin.  CXR reviewed with radiology resident and compared to prior.

## 2019-02-01 NOTE — ED PROVIDER NOTE - OBJECTIVE STATEMENT
34 y/o female with PMHx of heroin abuse (currently in methadone maintenance program), kidney stones, and PUD presents to ED c/o fever. Patient brought in by EMS from her [facility] rehab program (reports that she took Methadone today but denies any heroin use). However, she reports productive cough, fever, nausea, and congestion, and was sent here for further evaluation to be cleared. Denies CP, SOB, vomiting, and abd pain. Patient is slightly sleepy in the ED has temperature of 100.3F. She is on Xanax, Gabapentin, and Seroquel. Denies any SI or HI.

## 2019-02-01 NOTE — ED PROVIDER NOTE - MEDICAL DECISION MAKING DETAILS
34 y/o female with PMHx of heroin abuse (currently in methadone maintenance program), kidney stones, and PUD presents to ED c/o fever. Patient brought in by EMS from her [facility] rehab program (reports that she took Methadone today but denies any heroin use). However, she reports productive cough, fever, nausea, and congestion, and was sent here for further evaluation to be cleared. Denies CP, SOB, vomiting, and abd pain. Patient is slightly sleepy in the ED has temperature of 100.3F. She is on Xanax, Gabapentin, and Seroquel. Denies any SI or HI.  Will place pt in CDU and administer azithromycin.  CXR reviewed with radiology resident and compared to prior.

## 2019-02-02 VITALS
TEMPERATURE: 98 F | OXYGEN SATURATION: 96 % | DIASTOLIC BLOOD PRESSURE: 65 MMHG | HEART RATE: 91 BPM | RESPIRATION RATE: 18 BRPM | SYSTOLIC BLOOD PRESSURE: 100 MMHG

## 2019-02-02 PROCEDURE — 99217: CPT

## 2019-02-02 NOTE — ED CDU PROVIDER DISPOSITION NOTE - CLINICAL COURSE
The patient is now awake and alert, clinically sober.  Able to walk a straight line.  Repeat exam and neuro/cranial nerve exams normal.  No evidence of head/neck trauma.  Patient denies any pain or other complaints.  Denies cp/sob/ha/abd pain.  Abd soft, lungs clear, heart exam normal.  Real po challenge. Denies any assault.  Feels much better and pt feels safe for discharge.  No evidence of intoxication at this time or alcohol withdrawal.  No other complaints on discharge.

## 2019-02-02 NOTE — ED CDU PROVIDER SUBSEQUENT DAY NOTE - HISTORY
Pt improving on observation.   The patient is now awake and alert, clinically sober.  Able to walk a straight line.  Repeat exam and neuro/cranial nerve exams normal.  No evidence of head/neck trauma.  Patient denies any pain or other complaints.  Denies cp/sob/ha/abd pain.  Abd soft, lungs clear, heart exam normal.  Real po challenge.  Denies any assault.  Feels much better and pt feels safe for discharge.  No evidence of intoxication at this time or alcohol withdrawal.  No other complaints on discharge.

## 2019-02-02 NOTE — ED CDU PROVIDER DISPOSITION NOTE - NSFOLLOWUPINSTRUCTIONS_ED_ALL_ED_FT
PLEASE FOLLOW-UP WITH YOUR PRIMARY CARE DOCTOR IN 1-2 DAY FOR FURTHER EVALUATION.  PLEASE TAKE ALL PAPERWORK FROM TODAY'S VISIT TO YOUR PRIMARY DOCTOR.  IF YOU DO NOT HAVE A PRIMARY CARE DOCTOR PLEASE REFER TO THE OFFICE/CLINIC INFORMATION GIVEN ABOVE.  YOU MAY ALSO CALL [phone] AND ASK FOR MS. ASIA DORADO.  SHE CAN HELP YOU MAKE A FOLLOW-UP APPOINTMENT.  HER HOURS ARE 11AM-7PM MONDAY - FRIDAY.    PLEASE RETURN TO THE ER IMMEDIATELY OR CALL 911 FOR ANY HIGH FEVER, TROUBLE BREATHING, VOMITING, SEVERE PAIN, OR ANY OTHER CONCERNS.

## 2019-02-02 NOTE — ED CDU PROVIDER DISPOSITION NOTE - NSFOLLOWUPCLINICS_GEN_ALL_ED_FT
[facility]  Family Medicine  [facility]. [address]  [place], [place] [facility]  Phone: [phone]  Fax:   Follow Up Time:

## 2019-02-02 NOTE — ED ADULT NURSE REASSESSMENT NOTE - NS ED NURSE REASSESS COMMENT FT1
received verbal report from FLORENCIA Nowak; patient ambulatory to bathroom with steady gait; ok to discharge as per MD Austin to [facility]; given metro card, list of shelters and detox centers; to wait in lobby or  in AM; signed dc papers and escorted with luggage to [facility]

## 2019-02-07 LAB
CULTURE RESULTS: SIGNIFICANT CHANGE UP
SPECIMEN SOURCE: SIGNIFICANT CHANGE UP

## 2019-03-27 ENCOUNTER — EMERGENCY (EMERGENCY)
Facility: HOSPITAL | Age: 34
LOS: 1 days | Discharge: HOME | End: 2019-03-27
Attending: EMERGENCY MEDICINE | Admitting: EMERGENCY MEDICINE
Payer: MEDICAID

## 2019-03-27 VITALS
SYSTOLIC BLOOD PRESSURE: 106 MMHG | DIASTOLIC BLOOD PRESSURE: 73 MMHG | HEART RATE: 75 BPM | OXYGEN SATURATION: 100 % | RESPIRATION RATE: 18 BRPM | TEMPERATURE: 99 F

## 2019-03-27 VITALS
DIASTOLIC BLOOD PRESSURE: 76 MMHG | RESPIRATION RATE: 16 BRPM | SYSTOLIC BLOOD PRESSURE: 114 MMHG | TEMPERATURE: 99 F | HEART RATE: 67 BPM | OXYGEN SATURATION: 98 %

## 2019-03-27 DIAGNOSIS — R41.82 ALTERED MENTAL STATUS, UNSPECIFIED: ICD-10-CM

## 2019-03-27 DIAGNOSIS — Z88.0 ALLERGY STATUS TO PENICILLIN: ICD-10-CM

## 2019-03-27 DIAGNOSIS — F11.10 OPIOID ABUSE, UNCOMPLICATED: ICD-10-CM

## 2019-03-27 DIAGNOSIS — Z79.891 LONG TERM (CURRENT) USE OF OPIATE ANALGESIC: ICD-10-CM

## 2019-03-27 DIAGNOSIS — Z79.1 LONG TERM (CURRENT) USE OF NON-STEROIDAL ANTI-INFLAMMATORIES (NSAID): ICD-10-CM

## 2019-03-27 DIAGNOSIS — Z79.2 LONG TERM (CURRENT) USE OF ANTIBIOTICS: ICD-10-CM

## 2019-03-27 DIAGNOSIS — Z88.8 ALLERGY STATUS TO OTHER DRUGS, MEDICAMENTS AND BIOLOGICAL SUBSTANCES STATUS: ICD-10-CM

## 2019-03-27 DIAGNOSIS — Z79.899 OTHER LONG TERM (CURRENT) DRUG THERAPY: ICD-10-CM

## 2019-03-27 LAB
ALBUMIN SERPL ELPH-MCNC: 3.5 G/DL — SIGNIFICANT CHANGE UP (ref 3.4–5)
ALP SERPL-CCNC: 123 U/L — HIGH (ref 40–120)
ALT FLD-CCNC: 76 U/L — HIGH (ref 12–42)
AMYLASE P1 CFR SERPL: 30 U/L — SIGNIFICANT CHANGE UP (ref 25–115)
ANION GAP SERPL CALC-SCNC: 12 MMOL/L — SIGNIFICANT CHANGE UP (ref 9–16)
APTT BLD: 35.2 SEC — SIGNIFICANT CHANGE UP (ref 27.5–36.3)
AST SERPL-CCNC: 42 U/L — HIGH (ref 15–37)
BILIRUB SERPL-MCNC: 0.2 MG/DL — SIGNIFICANT CHANGE UP (ref 0.2–1.2)
BUN SERPL-MCNC: 7 MG/DL — SIGNIFICANT CHANGE UP (ref 7–23)
CALCIUM SERPL-MCNC: 8.6 MG/DL — SIGNIFICANT CHANGE UP (ref 8.5–10.5)
CHLORIDE SERPL-SCNC: 106 MMOL/L — SIGNIFICANT CHANGE UP (ref 96–108)
CO2 SERPL-SCNC: 24 MMOL/L — SIGNIFICANT CHANGE UP (ref 22–31)
CREAT SERPL-MCNC: 0.76 MG/DL — SIGNIFICANT CHANGE UP (ref 0.5–1.3)
ETHANOL SERPL-MCNC: <3 MG/DL — SIGNIFICANT CHANGE UP
GLUCOSE SERPL-MCNC: 173 MG/DL — HIGH (ref 70–99)
HCT VFR BLD CALC: 38.9 % — SIGNIFICANT CHANGE UP (ref 34.5–45)
HGB BLD-MCNC: 13.4 G/DL — SIGNIFICANT CHANGE UP (ref 11.5–15.5)
INR BLD: 1.05 — SIGNIFICANT CHANGE UP (ref 0.88–1.16)
LACTATE SERPL-SCNC: 1.4 MMOL/L — SIGNIFICANT CHANGE UP (ref 0.4–2)
LIDOCAIN IGE QN: 59 U/L — LOW (ref 73–393)
MCHC RBC-ENTMCNC: 30.9 PG — SIGNIFICANT CHANGE UP (ref 27–34)
MCHC RBC-ENTMCNC: 34.4 G/DL — SIGNIFICANT CHANGE UP (ref 32–36)
MCV RBC AUTO: 89.8 FL — SIGNIFICANT CHANGE UP (ref 80–100)
PLATELET # BLD AUTO: 227 K/UL — SIGNIFICANT CHANGE UP (ref 150–400)
POTASSIUM SERPL-MCNC: 3.8 MMOL/L — SIGNIFICANT CHANGE UP (ref 3.5–5.3)
POTASSIUM SERPL-SCNC: 3.8 MMOL/L — SIGNIFICANT CHANGE UP (ref 3.5–5.3)
PROT SERPL-MCNC: 6.9 G/DL — SIGNIFICANT CHANGE UP (ref 6.4–8.2)
PROTHROM AB SERPL-ACNC: 11.6 SEC — SIGNIFICANT CHANGE UP (ref 10–12.9)
RBC # BLD: 4.33 M/UL — SIGNIFICANT CHANGE UP (ref 3.8–5.2)
RBC # FLD: 13 % — SIGNIFICANT CHANGE UP (ref 10.3–14.5)
SODIUM SERPL-SCNC: 142 MMOL/L — SIGNIFICANT CHANGE UP (ref 132–145)
WBC # BLD: 6.4 K/UL — SIGNIFICANT CHANGE UP (ref 3.8–10.5)
WBC # FLD AUTO: 6.4 K/UL — SIGNIFICANT CHANGE UP (ref 3.8–10.5)

## 2019-03-27 PROCEDURE — 99220: CPT

## 2019-03-27 NOTE — ED ADULT NURSE REASSESSMENT NOTE - NS ED NURSE REASSESS COMMENT FT1
Patient asleep in stretcher, appears in no acute distress. Arrosable to touch, falls back asleep. Respirations are even and unlabored. Safety and comfort measures maintained. Will continue to monitor.

## 2019-03-27 NOTE — ED CDU PROVIDER INITIAL DAY NOTE - NSTIMEPROVIDERCAREINITIATE_GEN_ER
Patient was seen for stomach pain last week which stopped on Thursday, but patient started having pain again last night and vomiting. Please call.      27-Mar-2019 12:01

## 2019-03-27 NOTE — ED PROVIDER NOTE - CPE EDP MUSC NORM
"Subjective:       History was provided by the mother.  Katie Fuller is a 21 m.o. male who presents with possible ear infection. Symptoms include right ear drainage  and right ear pain, congestion, cough. Symptoms began 4 days ago and there has been little improvement since that time. Patient completed Augmentin and ear drops course recently after being seen on 11/5/17. Patient denies fever. History of previous ear infections: yes - has PE tubes. Patient has had good PO intake, with adequate urine output.    ENT recently checked adenoid size and no hypertrophy on x-ray.   Family owns dogs.      Past Medical History  I have reviewed patient's past medical history and it is pertinent for:  Patient Active Problem List    Diagnosis Date Noted    S/P tympanostomy tube placement 03/02/2017    OM (otitis media), recurrent 03/02/2017     Review of Systems   Constitutional: Negative for chills and fever.   HENT: Positive for congestion, ear discharge and ear pain. Negative for sore throat.    Respiratory: Negative for cough and wheezing.    Gastrointestinal: Negative for constipation, diarrhea, nausea and vomiting.   Genitourinary: Negative for dysuria.   Skin: Negative for rash.     Objective:    Ht 2' 9.5" (0.851 m)   Wt 15 kg (33 lb 2.2 oz)   HC 49 cm (19.29")   BMI 20.76 kg/m²   Physical Exam   Constitutional: He appears well-nourished. He is active.   HENT:   Head: Atraumatic.   Nose: Nasal discharge present.   Mouth/Throat: Mucous membranes are moist. No dental caries. No tonsillar exudate. Oropharynx is clear. Pharynx is normal.   PE tube present and patent in L canal, no drainage. Mild erythema of R TM with small amount purulent drainage visible in PE tube on R   Eyes: Conjunctivae and EOM are normal. Pupils are equal, round, and reactive to light. Right eye exhibits no discharge. Left eye exhibits no discharge.   Neck: Normal range of motion. Neck supple.   Cardiovascular: Normal rate, regular rhythm, S1 normal " and S2 normal.    No murmur heard.  Pulmonary/Chest: Effort normal and breath sounds normal. No nasal flaring or stridor. No respiratory distress. He has no wheezes. He has no rales. He exhibits no retraction.   Abdominal: Soft. Bowel sounds are normal. He exhibits no distension and no mass. There is no hepatosplenomegaly. There is no tenderness. There is no rebound and no guarding.   Musculoskeletal: Normal range of motion.   Lymphadenopathy:     He has no cervical adenopathy.   Neurological: He is alert.   Skin: Skin is warm. Capillary refill takes less than 2 seconds. No rash noted.   Nursing note and vitals reviewed.    Assessment:   Follow up    Right ear pain    Otorrhea of right ear  -     ofloxacin (FLOXIN) 0.3 % otic solution; Place 5 drops into the right ear 2 (two) times daily.  Dispense: 10 mL; Refill: 0      Plan:      Analgesics discussed.  Antibiotic per orders.  Warm compress to affected ear(s).  Fluids, rest.  RTC if symptoms worsening or not improving in 1-2 days.      normal...

## 2019-03-27 NOTE — ED PROVIDER NOTE - CONSTITUTIONAL, MLM
normal... Awake, alert, oriented to person, place, time/situation and in no apparent distress. Patient is drowsy appearing but conversant.

## 2019-03-27 NOTE — ED ADULT NURSE NOTE - OBJECTIVE STATEMENT
33 y.o F biba from methadone clinic for AMS. Pt reports difficulty sleeping past few days and states she is "very tired". Pt deneis any ilicit drug use or alcohol use. Denies any CP, SOB, N/V, dizziness HA, fever or chills.

## 2019-03-27 NOTE — ED PROVIDER NOTE - NEUROLOGICAL, MLM
Alert and oriented, no focal deficits, no motor or sensory deficits. Patient is drowsy appearing but conversant.

## 2019-03-27 NOTE — ED CDU PROVIDER DISPOSITION NOTE - NSFOLLOWUPINSTRUCTIONS_ED_ALL_ED_FT
Please see your doctor this week.  Please let them know your liver function tests were elevated and that you may need these repeated.  Please return here or any emergency department if you have abdominal pain, jaundice (yellow skin), fever or pain.  If you cannot see your doctor please call Patient Access Services to schedule an appointment for you.

## 2019-03-27 NOTE — ED PROVIDER NOTE - OBJECTIVE STATEMENT
32 y/o female with PMHx of heroin abuse (currently in methadone maintenance program), kidney stones, and PUD presents to ED for AMS. Patient was picked up from her Methadone Clinic by EMS due to being "sleepy". Patient reports she has been unable to sleep for the past few days, and is very tired. States she was at the Methadone Clinic and her counsellor suspected she had taken drugs, and had called EMS. Patient currently denies any illicit drug use today.

## 2019-03-27 NOTE — ED CDU PROVIDER INITIAL DAY NOTE - MEDICAL DECISION MAKING DETAILS
32 y/o female with PMHx of heroin abuse (currently in methadone maintenance program), kidney stones, and PUD presents to ED for AMS. Patient was picked up from her Methadone Clinic by EMS due to being "sleepy". Patient reports she has been unable to sleep for the past few days, and is very tired. States she was at the Methadone Clinic and her counsellor suspected she had taken drugs, and had called EMS. Patient currently denies any illicit drug use today.  CDU until sober and SBIRT at that time

## 2019-03-27 NOTE — ED ADULT NURSE NOTE - CHPI ED NUR SYMPTOMS NEG
no pain/no weakness/no tingling/no decreased eating/drinking/no dizziness/no fever/no nausea/no chills/no vomiting

## 2019-03-27 NOTE — ED PROVIDER NOTE - MUSCULOSKELETAL, MLM
Spine appears normal, range of motion is not limited, no muscle or joint tenderness. Patient is moving all extremities.

## 2019-03-27 NOTE — ED CDU PROVIDER DISPOSITION NOTE - CLINICAL COURSE
Patient with opioid use disorder presented somnolent and with nausea.  Patient had elevations in liver function tests.  Sono of abdomen ordered but patient declined.  Patient instructed to follow up with her doctor and return if she has pain/fever/vomting/jaundice or any concerns.

## 2019-03-27 NOTE — ED ADULT TRIAGE NOTE - CHIEF COMPLAINT QUOTE
here for ams- was picked up at her methadone clinic due to being "sleepy" pt arrives refusing narcan by EMS and awake and alert but arsouable- pt denies drug use or taking any of her regular medicine

## 2019-03-27 NOTE — ED PROVIDER NOTE - CLINICAL SUMMARY MEDICAL DECISION MAKING FREE TEXT BOX
32 y/o female with PMHx of heroin abuse (currently in methadone maintenance program), kidney stones, and PUD presents to ED for AMS. Patient was picked up from her Methadone Clinic by EMS due to being "sleepy". Patient reports she has been unable to sleep for the past few days, and is very tired. States she was at the Methadone Clinic and her counsellor suspected she had taken drugs, and had called EMS. Patient currently denies any illicit drug use today.  Patient unsafe for d/c at this time, will place in CDU and offer SBIRT when sober

## 2019-05-03 NOTE — H&P ADULT - NSHPPHYSICALEXAM_GEN_ALL_CORE
Vital Signs Last 24 Hrs  T(C): 36.8 (29 Aug 2018 16:45), Max: 37 (29 Aug 2018 11:03)  T(F): 98.2 (29 Aug 2018 16:45), Max: 98.6 (29 Aug 2018 11:03)  HR: 64 (29 Aug 2018 16:45) (62 - 80)  BP: 120/73 (29 Aug 2018 16:45) (109/82 - 124/75)  BP(mean): --  RR: 18 (29 Aug 2018 16:45) (18 - 20)  SpO2: 100% (29 Aug 2018 16:45) (100% - 100%)    HEENT:   pupils equal and reactive, EOMI, no oropharyngeal lesions, erythema, exudates, oral thrush    NECK:   supple, no carotid bruits, no palpable lymph nodes, no thyromegaly    CV:  +S1, +S2, regular, no murmurs or rubs    RESP:   lungs clear to auscultation bilaterally, no wheezing, rales, rhonchi, good air entry bilaterally    BREAST:  not examined    GI:  abdomen soft, non-tender, non-distended, normal BS, no bruits, no abdominal masses, no palpable masses    RECTAL:  not examined    :  not examined    MSK:   normal muscle tone, no atrophy, no rigidity, no contractions    EXT:   no clubbing, no cyanosis, no edema, no calf pain, swelling or erythema    VASCULAR:  pulses equal and symmetric in the upper and lower extremities    NEURO:  AAOX3, no focal neurological deficits, follows all commands, able to move extremities spontaneously    SKIN:  no ulcers, lesions or rashes Denies

## 2022-12-21 NOTE — H&P ADULT - FAMILY HISTORY
Continued Stay Note  [facility]     Patient Name: Buster Velasco  MRN: [id number]  Today's Date: 12/21/2022    Admit Date: 11/30/2022    Plan: [facility]   Discharge Plan     Row Name 12/21/22 1357       Plan    Plan [facility]    Patient/Family in Agreement with Plan yes    Plan Comments Nicole, admissions liaison with [facility], called today to get clarification on the length of Mr. Velasco's antibiotics. Per Dr. George's note it should continue through the beginning of January (anticipate a full 6 week course). I have updated Nicole on this plan and will await a bed offer/denial from her. Case management will continue to follow.    Final Discharge Disposition Code 62 - inpatient rehab facility               Discharge Codes    No documentation.               Expected Discharge Date and Time     Expected Discharge Date Expected Discharge Time    Dec 22, 2022             Alan Downing RN     No pertinent family history in first degree relatives

## 2023-08-24 ENCOUNTER — EMERGENCY (EMERGENCY)
Facility: HOSPITAL | Age: 38
LOS: 1 days | Discharge: HOME | End: 2023-08-24
Admitting: EMERGENCY MEDICINE
Payer: MEDICAID

## 2023-08-24 VITALS
OXYGEN SATURATION: 98 % | RESPIRATION RATE: 18 BRPM | HEART RATE: 60 BPM | SYSTOLIC BLOOD PRESSURE: 126 MMHG | DIASTOLIC BLOOD PRESSURE: 88 MMHG | TEMPERATURE: 98 F

## 2023-08-24 VITALS
WEIGHT: 160.06 LBS | SYSTOLIC BLOOD PRESSURE: 116 MMHG | OXYGEN SATURATION: 98 % | HEART RATE: 80 BPM | DIASTOLIC BLOOD PRESSURE: 73 MMHG | TEMPERATURE: 98 F | RESPIRATION RATE: 18 BRPM

## 2023-08-24 DIAGNOSIS — Z88.0 ALLERGY STATUS TO PENICILLIN: ICD-10-CM

## 2023-08-24 DIAGNOSIS — Z87.442 PERSONAL HISTORY OF URINARY CALCULI: ICD-10-CM

## 2023-08-24 DIAGNOSIS — R10.9 UNSPECIFIED ABDOMINAL PAIN: ICD-10-CM

## 2023-08-24 DIAGNOSIS — Z86.59 PERSONAL HISTORY OF OTHER MENTAL AND BEHAVIORAL DISORDERS: ICD-10-CM

## 2023-08-24 DIAGNOSIS — Z88.6 ALLERGY STATUS TO ANALGESIC AGENT: ICD-10-CM

## 2023-08-24 DIAGNOSIS — K92.0 HEMATEMESIS: ICD-10-CM

## 2023-08-24 DIAGNOSIS — Z88.8 ALLERGY STATUS TO OTHER DRUGS, MEDICAMENTS AND BIOLOGICAL SUBSTANCES: ICD-10-CM
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
## 2023-08-24 DIAGNOSIS — N39.0 URINARY TRACT INFECTION, SITE NOT SPECIFIED: ICD-10-CM

## 2023-08-24 DIAGNOSIS — F17.200 NICOTINE DEPENDENCE, UNSPECIFIED, UNCOMPLICATED: ICD-10-CM

## 2023-08-24 DIAGNOSIS — E87.6 HYPOKALEMIA: ICD-10-CM

## 2023-08-24 DIAGNOSIS — Z90.49 ACQUIRED ABSENCE OF OTHER SPECIFIED PARTS OF DIGESTIVE TRACT: ICD-10-CM

## 2023-08-24 DIAGNOSIS — K27.9 PEPTIC ULCER, SITE UNSPECIFIED, UNSPECIFIED AS ACUTE OR CHRONIC, WITHOUT HEMORRHAGE OR PERFORATION: ICD-10-CM
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
## 2023-08-24 LAB
ALBUMIN SERPL ELPH-MCNC: 4.2 G/DL — SIGNIFICANT CHANGE UP (ref 3.4–5)
ALP SERPL-CCNC: 103 U/L — SIGNIFICANT CHANGE UP (ref 40–120)
ALT FLD-CCNC: 36 U/L — SIGNIFICANT CHANGE UP (ref 12–42)
ANION GAP SERPL CALC-SCNC: 9 MMOL/L — SIGNIFICANT CHANGE UP (ref 9–16)
APPEARANCE UR: CLEAR — SIGNIFICANT CHANGE UP
APTT BLD: 36.4 SEC — HIGH (ref 24.5–35.6)
AST SERPL-CCNC: 27 U/L — SIGNIFICANT CHANGE UP (ref 15–37)
BASOPHILS # BLD AUTO: 0.03 K/UL — SIGNIFICANT CHANGE UP (ref 0–0.2)
BASOPHILS NFR BLD AUTO: 0.4 % — SIGNIFICANT CHANGE UP (ref 0–2)
BILIRUB SERPL-MCNC: 0.2 MG/DL — SIGNIFICANT CHANGE UP (ref 0.2–1.2)
BILIRUB UR-MCNC: NEGATIVE — SIGNIFICANT CHANGE UP
BUN SERPL-MCNC: 13 MG/DL — SIGNIFICANT CHANGE UP (ref 7–23)
CALCIUM SERPL-MCNC: 9 MG/DL — SIGNIFICANT CHANGE UP (ref 8.5–10.5)
CHLORIDE SERPL-SCNC: 99 MMOL/L — SIGNIFICANT CHANGE UP (ref 96–108)
CO2 SERPL-SCNC: 30 MMOL/L — SIGNIFICANT CHANGE UP (ref 22–31)
COLOR SPEC: YELLOW — SIGNIFICANT CHANGE UP
CREAT SERPL-MCNC: 0.99 MG/DL — SIGNIFICANT CHANGE UP (ref 0.5–1.3)
DIFF PNL FLD: ABNORMAL
EGFR: 76 ML/MIN/1.73M2 — SIGNIFICANT CHANGE UP
EOSINOPHIL # BLD AUTO: 0.19 K/UL — SIGNIFICANT CHANGE UP (ref 0–0.5)
EOSINOPHIL NFR BLD AUTO: 2.7 % — SIGNIFICANT CHANGE UP (ref 0–6)
GLUCOSE SERPL-MCNC: 93 MG/DL — SIGNIFICANT CHANGE UP (ref 70–99)
GLUCOSE UR QL: NEGATIVE MG/DL — SIGNIFICANT CHANGE UP
HCG UR QL: NEGATIVE — SIGNIFICANT CHANGE UP
HCT VFR BLD CALC: 37.4 % — SIGNIFICANT CHANGE UP (ref 34.5–45)
HGB BLD-MCNC: 12.4 G/DL — SIGNIFICANT CHANGE UP (ref 11.5–15.5)
IMM GRANULOCYTES NFR BLD AUTO: 0.1 % — SIGNIFICANT CHANGE UP (ref 0–0.9)
INR BLD: 1.03 — SIGNIFICANT CHANGE UP (ref 0.85–1.18)
KETONES UR-MCNC: ABNORMAL MG/DL
LEUKOCYTE ESTERASE UR-ACNC: ABNORMAL
LIDOCAIN IGE QN: 48 U/L — LOW (ref 73–393)
LYMPHOCYTES # BLD AUTO: 3.82 K/UL — HIGH (ref 1–3.3)
LYMPHOCYTES # BLD AUTO: 53.6 % — HIGH (ref 13–44)
MCHC RBC-ENTMCNC: 28.6 PG — SIGNIFICANT CHANGE UP (ref 27–34)
MCHC RBC-ENTMCNC: 33.2 GM/DL — SIGNIFICANT CHANGE UP (ref 32–36)
MCV RBC AUTO: 86.4 FL — SIGNIFICANT CHANGE UP (ref 80–100)
MONOCYTES # BLD AUTO: 0.32 K/UL — SIGNIFICANT CHANGE UP (ref 0–0.9)
MONOCYTES NFR BLD AUTO: 4.5 % — SIGNIFICANT CHANGE UP (ref 2–14)
NEUTROPHILS # BLD AUTO: 2.76 K/UL — SIGNIFICANT CHANGE UP (ref 1.8–7.4)
NEUTROPHILS NFR BLD AUTO: 38.7 % — LOW (ref 43–77)
NITRITE UR-MCNC: NEGATIVE — SIGNIFICANT CHANGE UP
NRBC # BLD: 0 /100 WBCS — SIGNIFICANT CHANGE UP (ref 0–0)
PCP SPEC-MCNC: SIGNIFICANT CHANGE UP
PH UR: 6.5 — SIGNIFICANT CHANGE UP (ref 5–8)
PLATELET # BLD AUTO: 207 K/UL — SIGNIFICANT CHANGE UP (ref 150–400)
POTASSIUM SERPL-MCNC: 3.3 MMOL/L — LOW (ref 3.5–5.3)
POTASSIUM SERPL-SCNC: 3.3 MMOL/L — LOW (ref 3.5–5.3)
PROT SERPL-MCNC: 7.8 G/DL — SIGNIFICANT CHANGE UP (ref 6.4–8.2)
PROT UR-MCNC: NEGATIVE MG/DL — SIGNIFICANT CHANGE UP
PROTHROM AB SERPL-ACNC: 11.3 SEC — SIGNIFICANT CHANGE UP (ref 9.5–13)
RBC # BLD: 4.33 M/UL — SIGNIFICANT CHANGE UP (ref 3.8–5.2)
RBC # FLD: 13.8 % — SIGNIFICANT CHANGE UP (ref 10.3–14.5)
SODIUM SERPL-SCNC: 138 MMOL/L — SIGNIFICANT CHANGE UP (ref 132–145)
SP GR SPEC: 1.02 — SIGNIFICANT CHANGE UP (ref 1–1.03)
UROBILINOGEN FLD QL: 1 MG/DL — SIGNIFICANT CHANGE UP (ref 0.2–1)
WBC # BLD: 7.13 K/UL — SIGNIFICANT CHANGE UP (ref 3.8–10.5)
WBC # FLD AUTO: 7.13 K/UL — SIGNIFICANT CHANGE UP (ref 3.8–10.5)

## 2023-08-24 PROCEDURE — 74174 CTA ABD&PLVS W/CONTRAST: CPT | Mod: 26

## 2023-08-24 PROCEDURE — 99053 MED SERV 10PM-8AM 24 HR FAC: CPT

## 2023-08-24 PROCEDURE — 99285 EMERGENCY DEPT VISIT HI MDM: CPT

## 2023-08-24 PROCEDURE — 71045 X-RAY EXAM CHEST 1 VIEW: CPT | Mod: 26

## 2023-08-24 RX ORDER — SUCRALFATE 1 G
10 TABLET ORAL
Qty: 150 | Refills: 0
Start: 2023-08-24 | End: 2023-08-30

## 2023-08-24 RX ORDER — METOCLOPRAMIDE HCL 10 MG
10 TABLET ORAL ONCE
Refills: 0 | Status: COMPLETED | OUTPATIENT
Start: 2023-08-24 | End: 2023-08-24

## 2023-08-24 RX ORDER — PANTOPRAZOLE SODIUM 20 MG/1
1 TABLET, DELAYED RELEASE ORAL
Qty: 30 | Refills: 0
Start: 2023-08-24 | End: 2023-09-07

## 2023-08-24 RX ORDER — POTASSIUM CHLORIDE 20 MEQ
40 PACKET (EA) ORAL ONCE
Refills: 0 | Status: COMPLETED | OUTPATIENT
Start: 2023-08-24 | End: 2023-08-24

## 2023-08-24 RX ORDER — DIPHENHYDRAMINE HCL 50 MG
25 CAPSULE ORAL ONCE
Refills: 0 | Status: COMPLETED | OUTPATIENT
Start: 2023-08-24 | End: 2023-08-24

## 2023-08-24 RX ORDER — PANTOPRAZOLE SODIUM 20 MG/1
40 TABLET, DELAYED RELEASE ORAL ONCE
Refills: 0 | Status: COMPLETED | OUTPATIENT
Start: 2023-08-24 | End: 2023-08-24

## 2023-08-24 RX ORDER — METOCLOPRAMIDE HCL 10 MG
1 TABLET ORAL
Qty: 12 | Refills: 0
Start: 2023-08-24

## 2023-08-24 RX ADMIN — Medication 40 MILLIEQUIVALENT(S): at 04:18

## 2023-08-24 RX ADMIN — Medication 10 MILLIGRAM(S): at 02:51

## 2023-08-24 RX ADMIN — Medication 1 TABLET(S): at 03:46

## 2023-08-24 RX ADMIN — Medication 25 MILLIGRAM(S): at 03:46

## 2023-08-24 RX ADMIN — Medication 30 MILLILITER(S): at 02:51

## 2023-08-24 RX ADMIN — PANTOPRAZOLE SODIUM 40 MILLIGRAM(S): 20 TABLET, DELAYED RELEASE ORAL at 02:51
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
## 2023-08-24 NOTE — ED ADULT NURSE NOTE - TEMPLATE
Manually uploaded and hyper-linked HEMOGLOBIN A1C, CMP & LIPID PANEL 3/14/2023    
Abdominal Pain, N/V/D

## 2023-08-24 NOTE — ED PROVIDER NOTE - NSFOLLOWUPINSTRUCTIONS_ED_ALL_ED_FT
Peptic Ulcer  Body outline showing digestive organs with a close-up of ulcers inside the stomach.  A peptic ulcer is a painful sore in the lining of your stomach or the first part of your small intestine.    What are the causes?  Common causes of this condition include:  An infection.  Using certain pain medicines too often or too much.  Rare tumors in the stomach, small intestine, or pancreas.  What increases the risk?  You are more likely to get this condition if you:  Smoke.  Have a family history of ulcer disease.  Drink alcohol.  Have been hospitalized in an intensive care unit (ICU).  What are the signs or symptoms?  Symptoms include:  Burning pain in the area between the chest and the belly button. The pain may:  Not go away (be persistent).  Be worse when your stomach is empty.  Be worse at night.  Heartburn.  Feeling sick to your stomach (nauseous) and throwing up (vomiting).  Bloating.  If the ulcer results in bleeding, it can cause you to:  Have poop (stool) that is black and looks like tar.  Throw up bright red blood.  Throw up material that looks like coffee grounds.  How is this treated?  Treatment for this condition may include:  Stopping things that can cause the ulcer, such as:  Smoking.  Using pain medicines.  Drinking alcohol or caffeine.  Medicines to reduce stomach acid.  Antibiotic medicines if the ulcer is caused by an infection.  A procedure that is done using a small, flexible tube that has a camera at the end (upper endoscopy). This may be done if you have a bleeding ulcer.  Surgery. This may be needed if:  You have a lot of bleeding.  The ulcer caused a hole somewhere in the digestive system.  Follow these instructions at home:  Do not drink alcohol if your doctor tells you not to drink.  Limit how much caffeine you take in.  Do not smoke or use any products that contain nicotine or tobacco. If you need help quitting, ask your doctor.  Take over-the-counter and prescription medicines only as told by your doctor.  Do not stop or change your medicines unless you talk with your doctor about it first.  Do not take aspirin, ibuprofen, or other NSAIDs unless your doctor told you to do so.  Keep all follow-up visits.  Contact a doctor if:  You do not get better in 7 days after you start treatment.  You keep having an upset stomach (indigestion) or heartburn.  Get help right away if:  You have sudden, sharp pain in your belly (abdomen).  You have belly pain that does not go away.  You have bloody poop (stool) or black, tarry poop.  You throw up blood. It may look like coffee grounds.  You feel light-headed or feel like you may pass out (faint).  You get weak.  You get sweaty or feel sticky and cold to the touch (clammy).  These symptoms may be an emergency. Get help right away. Call 911.  Do not wait to see if the symptoms will go away.  Do not drive yourself to the hospital.  Summary  Symptoms of a peptic ulcer include burning pain in the area between the chest and the belly button.  Do not smoke or use any products that contain nicotine or tobacco. If you need help quitting, ask your doctor.  Take medicines only as told by your doctor.  Limit how much alcohol and caffeine you have.  Keep all follow-up visits.  This information is not intended to replace advice given to you by your health care provider. Make sure you discuss any questions you have with your health care provider.    Urinary Tract Infection    A urinary tract infection (UTI) is an infection of any part of the urinary tract, which includes the kidneys, ureters, bladder, and urethra. Risk factors include ignoring your need to urinate, wiping back to front if female, being an uncircumcised male, and having diabetes or a weak immune system. Symptoms include frequent urination, pain or burning with urination, foul smelling urine, cloudy urine, pain in the lower abdomen, blood in the urine, and fever. If you were prescribed an antibiotic medicine, take it as told by your health care provider. Do not stop taking the antibiotic even if you start to feel better.    SEEK IMMEDIATE MEDICAL CARE IF YOU HAVE ANY OF THE FOLLOWING SYMPTOMS: severe back or abdominal pain, fever, inability to keep fluids or medicine down, dizziness/lightheadedness, or a change in mental status.     Abdominal Pain    Many things can cause abdominal pain. Many times, abdominal pain is not caused by a disease and will improve without treatment. Your health care provider will do a physical exam to determine if there is a dangerous cause of your pain; blood tests and imaging may help determine the cause of your pain. However, in many cases, no cause may be found and you may need further testing as an outpatient. Monitor your abdominal pain for any changes.     SEEK IMMEDIATE MEDICAL CARE IF YOU HAVE ANY OF THE FOLLOWING SYMPTOMS: worsening abdominal pain, uncontrollable vomiting, profuse diarrhea, inability to have bowel movements or pass gas, black or bloody stools, fever accompanying chest pain or back pain, or fainting. These symptoms may represent a serious problem that is an emergency. Do not wait to see if the symptoms will go away. Get medical help right away. Call 911 and do not drive yourself to the hospital.

## 2023-08-24 NOTE — ED PROVIDER NOTE - PROGRESS NOTE DETAILS
monitored in the ED with no acute vomiting noted. Tolerating po at bedside with oral meds and liquids/sandwiches. AFVSS, abd soft and sleeping in the room currently. Ambulatory with steady gait

## 2023-08-24 NOTE — ED PROVIDER NOTE - CARE PROVIDER_API CALL
Armin Leos  Gastroenterology  [address] [place] [id number]  Phone: [phone]  Fax: [phone]  Follow Up Time:

## 2023-08-24 NOTE — ED ADULT NURSE NOTE - CAS TRG GEN SKIN COLOR
Patient Education     Well-Baby Checkup: 6 Months    At the 6-month checkup, the healthcare provider will examine your baby and ask how things are going at home. This sheet describes some of what you can expect.   Development and milestones  The healthcare provider will ask questions about your baby. And he or she will observe the baby to get an idea of the infant’s development. By this visit, your baby is likely doing some of the following:   · Grabbing his or her feet and sucking on toes  · Putting some weight on his or her legs (for example, “standing” on your lap while you hold him or her)  · Rolling over  · Sitting up for a few seconds at a time, when placed in a sitting position  · Babbling and laughing in response to words or noises made by others  Also, at 6 months some babies start to get teeth. If you have questions about teething, ask the healthcare provider.    Feeding tips  By 6 months, begin to add solid foods (solids) to your baby’s diet. At first, solids will not replace your baby’s regular breastmilk or formula feedings:   · In general, it doesn't matter what the first solid foods are. There is no current research stating that introducing solid foods in any distinct order is better for your baby. Traditionally, single-grain cereals are offered first, but single-ingredient strained or mashed vegetables or fruits are fine choices, too.  · When first offering solids, mix a small amount of breastmilk or formula with it in a bowl. When mixed, it should have a soupy texture. Feed this to the baby with a spoon once a day for the first 1 to 2 weeks.  · When offering single-ingredient foods such as homemade or store-bought baby food, introduce one new flavor of food every 3 to 5 days before trying a new or different flavor. Following each new food, be aware of possible allergic reactions such as diarrhea, rash, or vomiting. If your baby has any of these, stop offering the food and talk with your child's  healthcare provider.  · By 6 months of age, most  babies will need additional sources of iron and zinc. Your baby may benefit from baby food made with meat, which has more readily absorbed sources of iron and zinc.  · Feed solids once a day for the first 3 to 4 weeks. Then, increase feedings of solids to twice a day. During this time, also keep feeding your baby as much breastmilk or formula as you did before starting solids.  · For foods such as peanut and eggs that are typically considered highly allergic, experts suggest that introducing these foods by 4 to 6 months of age may actually reduce the risk for food allergy in babies and children. After other common foods (cereal, fruit, and vegetables) have been introduced and tolerated, you may begin to offer allergenic foods, one every 3 to 5 days. This helps isolate any allergic reaction that may occur.   · Ask the healthcare provider if your baby needs fluoride supplements.  Hygiene tips  · Your baby’s poop (bowel movement) will change after he or she begins eating solids. It may be thicker, darker, and smellier. This is normal. If you have questions, ask during the checkup.  · Ask the healthcare provider when your baby should have his or her first dental visit.    Sleeping tips  At 6 months of age, a baby is able to sleep 8 to 10 hours at night without waking. But many babies this age still do wake up once or twice a night. If your baby isn’t yet sleeping through the night, starting a bedtime routine may help (see below). To help your baby sleep safely and soundly:   · Put your baby on his or her back for all sleeping until the child is 1 year old. This can decrease the risk for SIDS (sudden infant death syndrome) and choking. Never place the baby on his or her side or stomach for sleep or naps. If the baby is awake, allow the child time on his or her tummy as long as there is supervision. This helps the child build strong tummy and neck muscles. This  will also help minimize flattening of the head that can happen when babies spend too much time on their backs.  · Don't put a crib bumper, pillow, loose blankets, or stuffed animals in the crib. These could suffocate the baby.  · Don't put your baby on a couch or armchair for sleep. Sleeping on a couch or armchair puts the infant at a much higher risk for death, including SIDS.  · Don't use an infant seat, car seat, stroller, infant carrier, or infant swing for routine sleep and daily naps. These may lead to blockage of a baby's airways or suffocation.  · Don't share a bed (co-sleep) with your baby. Bed-sharing has been shown to increase the risk for SIDS. The American Academy of Pediatrics recommends that babies sleep in the same room as their parents, close to their parents' bed, but in a separate bed or crib appropriate for babies. This sleeping arrangement is recommended ideally for the baby's first year, but should at least be maintained for the first 6 months.  · Always place cribs, bassinets, and play yards in hazard-free areas--those with no dangling cords, wires, or window coverings--to reduce the risk for strangulation.  · Don't put your child in the crib with a bottle.  · At this age, some parents let their babies cry themselves to sleep. This is a personal choice. You may want to discuss this with the healthcare provider.  Safety tips  · Don’t let your baby get hold of anything small enough to choke on. This includes toys, solid foods, and items on the floor that the baby may find while crawling. As a rule, an item small enough to fit inside a toilet paper tube can cause a child to choke.  · It’s still best to keep your baby out of the sun most of the time. Apply sunscreen to your baby as directed on the packaging.  · In the car, always put your baby in a rear-facing car seat. This should be secured in the back seat according to the car seat’s directions. Never leave the baby alone in the car at any  time.  · Don’t leave the baby on a high surface such as a table, bed, or couch. Your baby could fall off and get hurt. This is even more likely once the baby knows how to roll.  · Always strap your baby in when using a high chair.  · Soon your baby may be crawling, so it’s a good time to make sure your home is child-proofed. For example, put baby latches on cabinet doors and covers over all electrical outlets. Babies can get hurt by grabbing and pulling on items. For example, your baby could pull on a tablecloth or a cord, pulling something on top of him or her. To prevent this sort of accident, do a safety check of any area where your baby spends time.  · Older siblings can hold and play with the baby as long as an adult supervises.  · Walkers with wheels are not recommended. Stationary (not moving) activity stations are safer. Talk to the healthcare provider if you have questions about which toys and equipment are safe for your baby.    Vaccines  Based on recommendations from the CDC, at this visit your baby may receive the following vaccines. Depending on which combination vaccines are used by your healthcare provider, the number of vaccines in a series can vary based on the .   · Diphtheria, tetanus, and pertussis  · Haemophilus influenzae type b  · Hepatitis B  · Influenza (flu)  · Pneumococcus  · Polio  · Rotavirus  Having your baby fully vaccinated will also help lower your baby's risk for SIDS.  Setting a bedtime routine  Your baby is now old enough to sleep through the night. Like anything else, sleeping through the night is a skill that needs to be learned. A bedtime routine can help. By doing the same things each night, you teach the baby when it’s time for bed. You may not notice results right away, but stick with it. Over time, your baby will learn that bedtime is sleep time. These tips can help:   · Make preparing for bed a special time with your baby. Keep the routine the same each night.  Choose a bedtime and try to stick to it each night.  · Do relaxing activities before bed, such as a quiet bath followed by a bottle.  · Sing to the baby or tell a bedtime story. Even if your child is too young to understand, your voice will be soothing. Speak in calm, quiet tones.  · Don’t wait until the baby falls asleep to put him or her in the crib. Put the baby down awake as part of the routine.  · Keep the bedroom dark, quiet, and not too hot or too cold. Soothing music or recordings of relaxing sounds (such as ocean waves) may help your baby sleep.  [employer] last reviewed this educational content on 2020  © 1212-3588 [employer]. All rights reserved. This information is not intended as a substitute for professional medical care. Always follow your healthcare professional's instructions.           Patient Education         Normal for race

## 2023-08-24 NOTE — ED PROVIDER NOTE - PHYSICAL EXAMINATION
Gen - WDWN F, NAD, comfortable and non-toxic appearing, no active vomiting   Skin - warm, dry, intact   HEENT - AT/NC, no nasal discharge, airway patent, neck supple and FROM  CV - S1S2, R/R/R  Resp - CTAB, no r/r/w  GI - NABS, soft, mild erythema over the R mid abd region, TTP with guarding, no rebound, no CVAT b/l, no palpable pulsatile mass noted   MS - No acute or gross deformities noted to extremities. No midline spinal tenderness or step off on palpation  Neuro - AxOx3, ambulatory without gait disturbance
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
## 2023-08-24 NOTE — ED PROVIDER NOTE - PATIENT PORTAL LINK FT
You can access the FollowMyHealth Patient Portal offered by [facility] by registering at the following website: [email or web address]. By joining [facility]’s FollowMyHealth portal, you will also be able to view your health information using other applications (apps) compatible with our system.

## 2023-08-24 NOTE — ED ADULT NURSE NOTE - NSFALLUNIVINTERV_ED_ALL_ED
Bed/Stretcher in lowest position, wheels locked, appropriate side rails in place/Call bell, personal items and telephone in reach/Instruct patient to call for assistance before getting out of bed/chair/stretcher/Non-slip footwear applied when patient is off stretcher/[place] to call system/Physically safe environment - no spills, clutter or unnecessary equipment/Purposeful proactive rounding/Room/bathroom lighting operational, light cord in reach
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
## 2024-04-22 NOTE — ED ADULT NURSE NOTE - NS ED NURSE LEVEL OF CONSCIOUSNESS MENTAL STATUS
Pediatric Well Child Exam: 12 Months of age    Chief Complaint   Patient presents with   • Well Infant Birth to 23 Months     12 month well child exam.  Here with mom and brother.     • Allergies     Mom tried weaning patient from formula to whole milk. By the time he was weaned to straight milk, he developed a full body rash on the 3rd day.  Mom switched him back to formula. Patient also gets itchy skin at times.    • Well Infant Birth to 23 Months       SUBJECTIVE:  Kristian Matthews is a 11 month old male who presents for a well child exam.  Patient presents with Mother and with sibling(s).    Preferred method of results communication:   Cell Phone:   Telephone Information:   Mobile [phone]     Okay to leave a message containing results? Yes    CONCERNS RAISED TODAY: allergies.   Mom is concerned about milk allergy since when starting whole milk he broke out in a rash on the 3rd day. He does have dry itchy skin.   Rash did go away after 3 days. He does eat cheese, yogurt and other dairy products. Mom put him back on formula. He does complain of dry itchy skin.     SLEEP PATTERN:  Hours / Night: sleeping well through the night. Sleeps about 12 hours per night.  In crib    NAPS: Hours / Day: naps twice a day for about 1.5 hours each.    DIET/GI:  APPETITE: is a pretty good eater. Eating all table foods. Likes fruits and veggies, some meats. Favorite food-any fruit  FORMULA: [employer] advance  18 ounces/day. Mom stopped giving whole milk because patient broke out in a full body rash.   WATER SUPPLY AT HOME: city.  Wets:  6 daily  STOOLS: 1 / day.  No constipation    /HOMECARE:   :  []  YES     []  NO   None, home with mom during the day     FAMILY/HOME ENVIRONMENT:  Changes in home/work environment: No  Parents working outside the home: father  Toxic Exposure:  Tobacco Use: Not Asked         DEVELOPMENT:  [x]  YES    []  NO     []  UNKNOWN    Pulls to stand?  [x]  YES    []  NO     []  UNKNOWN     Walks holding on to furniture/pushing toy?  []  YES    [x]  NO     []  UNKNOWN    Takes independent steps?  [x]  YES    []  NO     []  UNKNOWN    Accurate pincer grasp?  [x]  YES    []  NO     []  UNKNOWN    Finger feeds part of meal?  [x]  YES    []  NO     []  UNKNOWN    Uses vocalization to get attentions?  [x]  YES    []  NO     []  UNKNOWN    Shows interest in things shown by parents?  [x]  YES    []  NO     []  UNKNOWN    Repetitive consonant sounds with emotion?  [x]  YES    []  NO     []  UNKNOWN    Tries to babble back with you?  [x]  YES    []  NO     []  UNKNOWN    Plays games like \"peek-a-pineda/pat-a-cake\"?    OBJECTIVE:  PAST HISTORIES:  Allergies, Medications, Medical history, Surgical history, Family history reviewed and updated.    IMMUNIZATION HISTORY:  IMMUNIZATION STATUS: Not up to date.  Needed immunizations include: MR, Varivax and Hep A.    IMMUNIZATION REACTIONS: None  VARICELLA STATUS: unknown    RECENT HEALTH EVENTS:  Illnesses: rash with whole milk..  Hospitalizations: None.  Injuries or Accidents: None.    REVIEW OF SYSTEMS:    All systems reviewed and negative except as documented in \"Concerns raised today\".    PHYSICAL EXAM:    VITAL SIGNS: Visit Vitals  Pulse 120   Temp 97.8 °F (36.6 °C) (Tympanic)   Resp 32   Ht 30.5\" (77.5 cm)   Wt 9.979 kg (22 lb)   HC 47 cm (18.5\")   BMI 16.63 kg/m²      64 %ile (Z= 0.35) based on WHO (Boys, 0-2 years) weight-for-age data using vitals from 4/22/2024.  GENERAL:  Well appearing male child.  Alert, active and consolable.  SKIN:  Warm, normal turgor.  No cyanosis.  No rash. Dry.  HEAD:  Normocephalic, atraumatic.    EYES:  Conjunctivae appear normal, noninjected, nonicteric, positive red reflex.  NOSE:  Appears normal without drainage.  EARS:  Normal external auditory canals. Tympanic membrane on the left are transparent with normal landmarks.  Right TM with some fluid present.  No redness or bulging.  THROAT:  Moist mucous membranes without lesions. 2  Cooperative/Awake/Alert lower incisors.   NECK:  Supple, no lymphadenopathy or masses.  HEART:  Regular rate and rhythm.  Normal S1, S2.  No murmurs, rubs, gallops.   LUNGS:  Clear to auscultation.  No wheezes, rales, rhonchi.  Normal work effort with breathing.  ABDOMEN:  Bowel sounds present. Soft, nontender.  No hepatomegaly.  No splenomegaly.  No masses.  GENITOURINARY: Landry stage 1. Bilateral testes without masses or hernia.  Rectum/anus patent.  EXTREMITIES: Normal bilateral range of motion of upper and lower extremities. Peripheral pulses 2/4. Equal femoral pulses.  BACK: Spine straight.   NEUROLOGIC:  Normal muscle tone and symmetrical strength. Symmetrical facial motor function.        Assessment:  11 month old male well child.    Plan:  All parental concerns and questions discussed.  Anticipatory guidance provided, handout(s) given   Development/ASQ reviewed. Reassured meeting all age appropriate developmental milestones. He is walking when hands held and hanging on but no independent walking. Will monitor closely but fel incoming weeks he will master.   Diet/concerns of a milk protein allergy addressed.  For now I would avoid milk but again in a month or so try to reintroduce and see if rash recurs it may have been a viral rash especially since there has no family history of a milk allergy and he is tolerating other dairy products.  General moisturizing on a daily basis was reviewed.  Accident prevention: Childproof home, Water safety and car seat safety  Name and vocalize objects  No bottle use: Transition to sippy cup  Pacifier user discussion  Analgesics/antipyretics  Sun exposure  Tobacco-free home  Dental care/teething and teeth brushing  Lead exposure risk  Fluoride supplementation discussed I applied fluoride varnish to 8 teeth as per the AAP recommendation after discussing risks/benefits with Mom and obtaining verbal consent.   Immunizations per orders.  Risks, benefits, and side effects discussed. Vaccine Information  Statement for Immunizations given.  Orders for immunizations given today per the recommended schedule.    Orders Placed This Encounter   • Developmental Screening w Score Per Standard Instrument   • APPLICATION OF TOP FLUORIDE VARNISH BY PHY OR [facility] CARE PROF   • MMR, Measles Mumps Rubella Vacc, SQ - IMM44   • Varicella Chicken Pox Live Vacc, SQ (Varivax) - IMM9   • Hepatitis A Vacc Pediatric / Adolescent 2-Dose Series - IMM31      Follow up: Return in about 3 months (around 7/22/2024) for Well Child Visit .

## 2024-05-09 NOTE — DISCHARGE NOTE ADULT - PROVIDER RX CONTACT NUMBER
Diagnoses and all orders for this visit:  Acute nasopharyngitis  Sore throat  -     POCT rapid strep A manually resulted  -     Group A Streptococcus, PCR; Future ; allow 24* for results   Plenty of fluids.  Follow up if symptoms are not beginning to improve after 7-10 days.  Follow up with any new concerns or questions.    
[phone]

## 2024-11-04 NOTE — ED ADULT TRIAGE NOTE - NS ED NURSE BANDS TYPE
Ambulatory Visit  Name: Margaux Zaragoza      : 1969      MRN: [id number]  Encounter Provider: Dianelys Penaloza MD  Encounter Date: 2024   Encounter department: [facility]    Assessment & Plan  Double vision  Not improving, scheduled to see opthalmology . Referral to neurology provided and MRI brain and orbits w/wo contrast ordered. Will continue to monitor.   Orders:    Ambulatory referral to Neurology; Future    MRI brain and orbits wo and w contrast; Future    Paresthesia  Referral to neurology provided and MRI brain and orbits w/wo contrast ordered. Will continue to monitor.   Orders:    Ambulatory referral to Neurology; Future    MRI brain and orbits wo and w contrast; Future    Primary hypertension  Not well controlled. Prescription sent for lisinopril (Zestril) 10 mg. Discussed side effects and advised patient to continue with BP monitoring log. Routine labs ordered to be completed prior to next visit. Recommended patient follow up in 1 month. Will continue to monitor.   Orders:    lisinopril (ZESTRIL) 10 mg tablet; Take 1 tablet (10 mg total) by mouth daily    CBC and differential; Future    Comprehensive metabolic panel; Future    TSH, 3rd generation with Free T4 reflex; Future    Lyme arthritis of knee (HCC)  Pt completed doxycycline treatment in 2024, knee pain has since resolved. Will recheck C-reactive protein. Referral provided to neurology and MRI brain and orbits w/wo contrast ordered. Will continue to monitor.   Orders:    C-reactive protein; Future       History of Present Illness     CARLOS is a 55 y.o. female with a past medical history of IgG+ for lyme disease, treated for lyme arthritis with doxycycline in 2024 who presents to the office today with elevated BP readings x 3 weeks. She was seen in the office on 10/11 with complaints of double vision and a referral to ophthalmology was provided. Her BP was mildly elevated in the office at this  "visit and she was advised to keep a BP log. When she monitored her blood pressure at come she frequently got readings >140/90. Her blood pressure in office today was 158/96. She notes that when she lays down at night she feels like her heart is beating \"hard.\" Pt also complains of headaches and paresthesias of the face bilaterally. She denies blurred vision, N/V/D, weakness, chest pain, and SOB.      History obtained from : patient  Review of Systems   Constitutional:  Negative for chills and fever.   HENT:  Negative for ear pain and sore throat.    Eyes:  Positive for visual disturbance. Negative for pain.   Respiratory:  Negative for cough and shortness of breath.    Cardiovascular:  Negative for chest pain and palpitations.   Gastrointestinal:  Negative for abdominal pain and vomiting.   Genitourinary:  Negative for dysuria and hematuria.   Musculoskeletal:  Negative for arthralgias and back pain.   Skin:  Negative for color change and rash.   Neurological:  Positive for headaches. Negative for seizures and syncope.        +paresthesias of bilateral face    All other systems reviewed and are negative.    Current Outpatient Medications on File Prior to Visit   Medication Sig Dispense Refill    Ferrous Sulfate (IRON PO) Take by mouth      VITAMIN D PO Take by mouth       No current facility-administered medications on file prior to visit.      Social History     Tobacco Use    Smoking status: Former     Types: Cigarettes    Smokeless tobacco: Never   Vaping Use    Vaping status: Never Used   Substance and Sexual Activity    Alcohol use: No    Drug use: No    Sexual activity: Not on file         Objective     /96 (BP Location: Left arm, Patient Position: Sitting, Cuff Size: Standard)   Pulse 56   Temp (!) 97.4 °F (36.3 °C) (Tympanic)   Resp 16   Ht 5' 8\" (1.727 m)   Wt 64.7 kg (142 lb 9.6 oz)   SpO2 99%   BMI 21.68 kg/m²     Physical Exam  Vitals and nursing note reviewed.   Constitutional:       " General: She is not in acute distress.     Appearance: She is well-developed.   HENT:      Head: Normocephalic and atraumatic.      Right Ear: Tympanic membrane and ear canal normal.      Left Ear: Tympanic membrane and ear canal normal.      Nose: Nose normal. No congestion.      Mouth/Throat:      Mouth: Mucous membranes are moist.      Pharynx: No posterior oropharyngeal erythema.   Eyes:      Conjunctiva/sclera: Conjunctivae normal.   Cardiovascular:      Rate and Rhythm: Normal rate and regular rhythm.      Heart sounds: No murmur heard.  Pulmonary:      Effort: Pulmonary effort is normal. No respiratory distress.      Breath sounds: Normal breath sounds.   Abdominal:      Palpations: Abdomen is soft.      Tenderness: There is no abdominal tenderness.   Musculoskeletal:         General: No swelling.      Cervical back: Neck supple.   Skin:     General: Skin is warm and dry.      Capillary Refill: Capillary refill takes less than 2 seconds.   Neurological:      General: No focal deficit present.      Mental Status: She is alert.   Psychiatric:         Mood and Affect: Mood normal.          Name band;

## 2025-06-20 NOTE — ED CDU PROVIDER NOTE - CONSTITUTIONAL MOOD
janet, cooerpative Skin normal color for race, warm, dry and intact. No evidence of rash. Skin normal color for race, warm, dry and intact. No evidence of rash.  visible abrasions to chest, BUE and left knee